# Patient Record
Sex: FEMALE | Race: WHITE | Employment: OTHER | URBAN - METROPOLITAN AREA
[De-identification: names, ages, dates, MRNs, and addresses within clinical notes are randomized per-mention and may not be internally consistent; named-entity substitution may affect disease eponyms.]

---

## 2023-08-03 ENCOUNTER — OFFICE VISIT (OUTPATIENT)
Dept: NEUROSURGERY | Age: 43
End: 2023-08-03
Payer: MEDICARE

## 2023-08-03 VITALS
WEIGHT: 290 LBS | HEIGHT: 67 IN | DIASTOLIC BLOOD PRESSURE: 78 MMHG | TEMPERATURE: 97 F | HEART RATE: 97 BPM | BODY MASS INDEX: 45.52 KG/M2 | SYSTOLIC BLOOD PRESSURE: 148 MMHG | OXYGEN SATURATION: 100 %

## 2023-08-03 DIAGNOSIS — C41.0 MALIGNANT NEOPLASM OF BONES OF SKULL AND FACE (HCC): Primary | ICD-10-CM

## 2023-08-03 PROBLEM — D16.4 BENIGN TUMOR OF BONES OF SKULL AND FACE: Status: ACTIVE | Noted: 2023-08-03

## 2023-08-03 PROCEDURE — 99204 OFFICE O/P NEW MOD 45 MIN: CPT | Performed by: NEUROLOGICAL SURGERY

## 2023-08-03 PROCEDURE — G8417 CALC BMI ABV UP PARAM F/U: HCPCS | Performed by: NEUROLOGICAL SURGERY

## 2023-08-03 PROCEDURE — G8427 DOCREV CUR MEDS BY ELIG CLIN: HCPCS | Performed by: NEUROLOGICAL SURGERY

## 2023-08-03 PROCEDURE — 1036F TOBACCO NON-USER: CPT | Performed by: NEUROLOGICAL SURGERY

## 2023-08-03 RX ORDER — HYDROXYCHLOROQUINE SULFATE 200 MG/1
TABLET, FILM COATED ORAL
COMMUNITY

## 2023-08-03 RX ORDER — NYSTATIN 100000 [USP'U]/G
POWDER TOPICAL
COMMUNITY
Start: 2023-07-26

## 2023-08-03 RX ORDER — PREDNISONE 5 MG/ML
SOLUTION ORAL
COMMUNITY

## 2023-08-03 RX ORDER — OMEPRAZOLE 20 MG/1
CAPSULE, DELAYED RELEASE ORAL
COMMUNITY

## 2023-08-03 RX ORDER — HYDROCODONE BITARTRATE AND ACETAMINOPHEN 10; 325 MG/1; MG/1
1 TABLET ORAL EVERY 6 HOURS PRN
COMMUNITY

## 2023-08-03 RX ORDER — ERGOCALCIFEROL 1.25 MG/1
CAPSULE ORAL
COMMUNITY
Start: 2023-07-17

## 2023-08-03 RX ORDER — FERROUS SULFATE 325(65) MG
TABLET ORAL
COMMUNITY
Start: 2023-06-25

## 2023-08-03 RX ORDER — LATANOPROST 50 UG/ML
SOLUTION/ DROPS OPHTHALMIC
COMMUNITY
Start: 2023-06-25

## 2023-08-03 RX ORDER — ASCORBIC ACID 500 MG
TABLET ORAL
COMMUNITY
Start: 2023-06-25

## 2023-08-03 RX ORDER — ACETAMINOPHEN 325 MG/1
TABLET ORAL
COMMUNITY
Start: 2023-06-24

## 2023-08-03 RX ORDER — GINSENG 100 MG
CAPSULE ORAL
COMMUNITY
Start: 2023-06-25

## 2023-08-03 RX ORDER — POTASSIUM CHLORIDE 20 MEQ/1
TABLET, EXTENDED RELEASE ORAL
COMMUNITY

## 2023-08-03 RX ORDER — GABAPENTIN 300 MG/1
CAPSULE ORAL
COMMUNITY
Start: 2023-07-17

## 2023-08-03 RX ORDER — FOLIC ACID 5 MG/ML
INJECTION, SOLUTION INTRAMUSCULAR; INTRAVENOUS; SUBCUTANEOUS
COMMUNITY

## 2023-08-03 RX ORDER — BUMETANIDE 2 MG/1
2 TABLET ORAL DAILY
COMMUNITY
Start: 2020-08-30

## 2023-08-03 RX ORDER — BRIMONIDINE TARTRATE 2 MG/ML
SOLUTION/ DROPS OPHTHALMIC
COMMUNITY

## 2023-08-03 NOTE — PROGRESS NOTES
History of Present Illness    Patient Words: 39 y.o. This patient is a 39 y.o. female who presents today for evaluation of a left-sided parietal skull tumor diagnosed by CT brain scanning when she presented to the emergency room in outside hospital for head trauma. No intracranial abnormalities were detected however the left parietal destructive lesion was seen. MRI scanning of the brain with and without contrast shows a contrast-enhancing lesion in the left parietal skull with inward extension and possible inner cortex disruption. The patient and family are poor historians but she did state that she was told that she had a spot on her lung. When asked if she has had scans of her chest abdomen pelvis she could not recall. She denies any pain in the left side of her skull. Past Medical History:   Diagnosis Date    Fibromyalgia     Glaucoma     Kidney disease     Lupus (HCC)     Rheumatoid arthritis (HCC)         Allergies   Allergen Reactions    Sulfa Antibiotics Rash    Acetazolamide         No family history on file.      Social History     Socioeconomic History    Marital status:      Spouse name: Not on file    Number of children: Not on file    Years of education: Not on file    Highest education level: Not on file   Occupational History    Not on file   Tobacco Use    Smoking status: Former     Types: Cigarettes     Quit date:      Years since quittin.5    Smokeless tobacco: Never   Substance and Sexual Activity    Alcohol use: Not Currently    Drug use: Not Currently    Sexual activity: Not on file   Other Topics Concern    Not on file   Social History Narrative    Not on file     Social Determinants of Health     Financial Resource Strain: Not on file   Food Insecurity: Not on file   Transportation Needs: Not on file   Physical Activity: Not on file   Stress: Not on file   Social Connections: Not on file   Intimate Partner Violence: Not on file   Housing Stability: Not on file

## 2023-08-08 ENCOUNTER — TELEPHONE (OUTPATIENT)
Dept: NEUROSURGERY | Age: 43
End: 2023-08-08

## 2023-08-08 DIAGNOSIS — Z12.89 ENCOUNTER FOR SCREENING FOR MALIGNANT NEOPLASM OF OTHER SITES: ICD-10-CM

## 2023-08-08 DIAGNOSIS — R93.89 ABNORMAL FINDINGS ON DIAGNOSTIC IMAGING OF OTHER SPECIFIED BODY STRUCTURES: ICD-10-CM

## 2023-08-08 DIAGNOSIS — C41.0 MALIGNANT NEOPLASM OF BONES OF SKULL AND FACE (HCC): Primary | ICD-10-CM

## 2023-08-08 NOTE — TELEPHONE ENCOUNTER
Please check out this CT order, pt needs ct chest abd pelvis to rule out malignancy. Code may be wrong.     Please call pt to give her an update  Thanks

## 2023-08-17 ENCOUNTER — TELEPHONE (OUTPATIENT)
Dept: NEUROSURGERY | Age: 43
End: 2023-08-17

## 2023-08-17 ENCOUNTER — HOSPITAL ENCOUNTER (INPATIENT)
Age: 43
LOS: 4 days | Discharge: HOME | DRG: 176 | End: 2023-08-21
Attending: EMERGENCY MEDICINE | Admitting: FAMILY MEDICINE
Payer: MEDICARE

## 2023-08-17 ENCOUNTER — HOSPITAL ENCOUNTER (OUTPATIENT)
Dept: CT IMAGING | Age: 43
Discharge: HOME | DRG: 176 | End: 2023-08-17
Attending: NEUROLOGICAL SURGERY
Payer: MEDICARE

## 2023-08-17 DIAGNOSIS — I26.99 PULMONARY EMBOLISM ON RIGHT (HCC): Primary | ICD-10-CM

## 2023-08-17 DIAGNOSIS — R93.89 ABNORMAL FINDINGS ON DIAGNOSTIC IMAGING OF OTHER SPECIFIED BODY STRUCTURES: ICD-10-CM

## 2023-08-17 DIAGNOSIS — C41.0 MALIGNANT NEOPLASM OF BONES OF SKULL AND FACE (HCC): ICD-10-CM

## 2023-08-17 DIAGNOSIS — N17.9 ACUTE KIDNEY INJURY (HCC): ICD-10-CM

## 2023-08-17 DIAGNOSIS — Z12.89 ENCOUNTER FOR SCREENING FOR MALIGNANT NEOPLASM OF OTHER SITES: ICD-10-CM

## 2023-08-17 PROBLEM — R65.21 SEPTIC SHOCK (HCC): Status: RESOLVED | Noted: 2020-08-26 | Resolved: 2023-08-17

## 2023-08-17 PROBLEM — I10 ESSENTIAL (PRIMARY) HYPERTENSION: Status: ACTIVE | Noted: 2020-08-28

## 2023-08-17 PROBLEM — G62.9 POLYNEUROPATHY, UNSPECIFIED: Status: ACTIVE | Noted: 2019-09-27

## 2023-08-17 PROBLEM — I82.509 CHRONIC DEEP VEIN THROMBOSIS (DVT) OF LOWER EXTREMITY (HCC): Status: RESOLVED | Noted: 2020-08-29 | Resolved: 2023-08-17

## 2023-08-17 PROBLEM — S92.351G CLOSED DISPLACED FRACTURE OF FIFTH METATARSAL BONE OF RIGHT FOOT WITH DELAYED HEALING: Status: RESOLVED | Noted: 2023-05-27 | Resolved: 2023-08-17

## 2023-08-17 PROBLEM — S81.812A LACERATION OF LEFT LOWER EXTREMITY: Status: ACTIVE | Noted: 2023-05-14

## 2023-08-17 PROBLEM — D50.0 IRON DEFICIENCY ANEMIA SECONDARY TO BLOOD LOSS (CHRONIC): Status: ACTIVE | Noted: 2019-09-27

## 2023-08-17 PROBLEM — N18.32 STAGE 3B CHRONIC KIDNEY DISEASE (HCC): Status: ACTIVE | Noted: 2021-04-09

## 2023-08-17 PROBLEM — R65.21 SEPTIC SHOCK (HCC): Status: ACTIVE | Noted: 2020-08-26

## 2023-08-17 PROBLEM — M32.14: Status: ACTIVE | Noted: 2019-09-27

## 2023-08-17 PROBLEM — R41.841 COGNITIVE COMMUNICATION DEFICIT: Status: ACTIVE | Noted: 2023-06-24

## 2023-08-17 PROBLEM — S92.351G CLOSED DISPLACED FRACTURE OF FIFTH METATARSAL BONE OF RIGHT FOOT WITH DELAYED HEALING: Status: ACTIVE | Noted: 2023-05-27

## 2023-08-17 PROBLEM — A41.9 SEPTIC SHOCK (HCC): Status: RESOLVED | Noted: 2020-08-26 | Resolved: 2023-08-17

## 2023-08-17 PROBLEM — K55.20 ANGIODYSPLASIA OF GASTROINTESTINAL TRACT: Status: RESOLVED | Noted: 2019-10-25 | Resolved: 2023-08-17

## 2023-08-17 PROBLEM — M32.14 SLE GLOMERULONEPHRITIS SYNDROME (HCC): Status: ACTIVE | Noted: 2017-04-04

## 2023-08-17 PROBLEM — M79.7 FIBROMYALGIA: Status: ACTIVE | Noted: 2017-07-25

## 2023-08-17 PROBLEM — L03.119 CELLULITIS OF EXTREMITY: Status: ACTIVE | Noted: 2020-08-26

## 2023-08-17 PROBLEM — L71.9 ROSACEA: Status: ACTIVE | Noted: 2017-07-20

## 2023-08-17 PROBLEM — A41.9 SEPTIC SHOCK (HCC): Status: ACTIVE | Noted: 2020-08-26

## 2023-08-17 PROBLEM — R91.1 SOLITARY PULMONARY NODULE: Status: ACTIVE | Noted: 2023-06-24

## 2023-08-17 PROBLEM — I82.509 CHRONIC DEEP VEIN THROMBOSIS (DVT) OF LOWER EXTREMITY (HCC): Status: ACTIVE | Noted: 2020-08-29

## 2023-08-17 PROBLEM — K21.9 GASTROESOPHAGEAL REFLUX DISEASE WITHOUT ESOPHAGITIS: Status: ACTIVE | Noted: 2023-06-24

## 2023-08-17 PROBLEM — B37.2 CANDIDIASIS OF SKIN: Status: ACTIVE | Noted: 2023-07-17

## 2023-08-17 PROBLEM — E66.01 MORBID OBESITY (HCC): Status: ACTIVE | Noted: 2023-06-24

## 2023-08-17 PROBLEM — K55.20 ANGIODYSPLASIA OF GASTROINTESTINAL TRACT: Status: ACTIVE | Noted: 2019-10-25

## 2023-08-17 PROBLEM — D84.9 IMMUNOSUPPRESSED STATUS (HCC): Chronic | Status: ACTIVE | Noted: 2023-08-17

## 2023-08-17 PROBLEM — E87.6 HYPOKALEMIA: Status: ACTIVE | Noted: 2023-08-17

## 2023-08-17 LAB
ALBUMIN SERPL-MCNC: 2.9 G/DL (ref 3.5–5)
ALBUMIN/GLOB SERPL: 0.8 (ref 0.4–1.6)
ALP SERPL-CCNC: 95 U/L (ref 50–136)
ALT SERPL-CCNC: 19 U/L (ref 12–65)
ANION GAP SERPL CALC-SCNC: 10 MMOL/L (ref 2–11)
APPEARANCE UR: CLEAR
APTT PPP: 21.6 SEC (ref 24.5–34.2)
AST SERPL-CCNC: 23 U/L (ref 15–37)
BACTERIA URNS QL MICRO: NEGATIVE /HPF
BASOPHILS # BLD: 0.1 K/UL (ref 0–0.2)
BASOPHILS NFR BLD: 0 % (ref 0–2)
BILIRUB SERPL-MCNC: 0.4 MG/DL (ref 0.2–1.1)
BILIRUB UR QL: NEGATIVE
BUN SERPL-MCNC: 41 MG/DL (ref 6–23)
CALCIUM SERPL-MCNC: 8.5 MG/DL (ref 8.3–10.4)
CASTS URNS QL MICRO: ABNORMAL /LPF
CHLORIDE SERPL-SCNC: 102 MMOL/L (ref 101–110)
CO2 SERPL-SCNC: 29 MMOL/L (ref 21–32)
COLOR UR: ABNORMAL
CREAT SERPL-MCNC: 1.9 MG/DL (ref 0.6–1)
DIFFERENTIAL METHOD BLD: ABNORMAL
EKG ATRIAL RATE: 116 BPM
EKG DIAGNOSIS: NORMAL
EKG P AXIS: 49 DEGREES
EKG P-R INTERVAL: 148 MS
EKG Q-T INTERVAL: 308 MS
EKG QRS DURATION: 66 MS
EKG QTC CALCULATION (BAZETT): 428 MS
EKG R AXIS: 35 DEGREES
EKG T AXIS: 33 DEGREES
EKG VENTRICULAR RATE: 116 BPM
EOSINOPHIL # BLD: 0 K/UL (ref 0–0.8)
EOSINOPHIL NFR BLD: 0 % (ref 0.5–7.8)
EPI CELLS #/AREA URNS HPF: ABNORMAL /HPF
ERYTHROCYTE [DISTWIDTH] IN BLOOD BY AUTOMATED COUNT: 16.8 % (ref 11.9–14.6)
ERYTHROCYTE [DISTWIDTH] IN BLOOD BY AUTOMATED COUNT: 16.8 % (ref 11.9–14.6)
GLOBULIN SER CALC-MCNC: 3.6 G/DL (ref 2.8–4.5)
GLUCOSE SERPL-MCNC: 124 MG/DL (ref 65–100)
GLUCOSE UR STRIP.AUTO-MCNC: NEGATIVE MG/DL
HCT VFR BLD AUTO: 33.6 % (ref 35.8–46.3)
HCT VFR BLD AUTO: 35.9 % (ref 35.8–46.3)
HGB BLD-MCNC: 10.3 G/DL (ref 11.7–15.4)
HGB BLD-MCNC: 11 G/DL (ref 11.7–15.4)
HGB UR QL STRIP: NEGATIVE
IMM GRANULOCYTES # BLD AUTO: 0.3 K/UL (ref 0–0.5)
IMM GRANULOCYTES NFR BLD AUTO: 2 % (ref 0–5)
INR PPP: 0.9
INR PPP: 0.9
KETONES UR QL STRIP.AUTO: NEGATIVE MG/DL
LEUKOCYTE ESTERASE UR QL STRIP.AUTO: ABNORMAL
LYMPHOCYTES # BLD: 2 K/UL (ref 0.5–4.6)
LYMPHOCYTES NFR BLD: 15 % (ref 13–44)
MAGNESIUM SERPL-MCNC: 2.4 MG/DL (ref 1.8–2.4)
MCH RBC QN AUTO: 30.4 PG (ref 26.1–32.9)
MCH RBC QN AUTO: 30.5 PG (ref 26.1–32.9)
MCHC RBC AUTO-ENTMCNC: 30.6 G/DL (ref 31.4–35)
MCHC RBC AUTO-ENTMCNC: 30.7 G/DL (ref 31.4–35)
MCV RBC AUTO: 99.1 FL (ref 82–102)
MCV RBC AUTO: 99.4 FL (ref 82–102)
MONOCYTES # BLD: 1 K/UL (ref 0.1–1.3)
MONOCYTES NFR BLD: 7 % (ref 4–12)
NEUTS SEG # BLD: 10.6 K/UL (ref 1.7–8.2)
NEUTS SEG NFR BLD: 76 % (ref 43–78)
NITRITE UR QL STRIP.AUTO: NEGATIVE
NRBC # BLD: 0 K/UL (ref 0–0.2)
NRBC # BLD: 0 K/UL (ref 0–0.2)
PH UR STRIP: 5.5 (ref 5–9)
PLATELET # BLD AUTO: 239 K/UL (ref 150–450)
PLATELET # BLD AUTO: 255 K/UL (ref 150–450)
PMV BLD AUTO: 9 FL (ref 9.4–12.3)
PMV BLD AUTO: 9.1 FL (ref 9.4–12.3)
POTASSIUM SERPL-SCNC: 3.3 MMOL/L (ref 3.5–5.1)
PROT SERPL-MCNC: 6.5 G/DL (ref 6.3–8.2)
PROT UR STRIP-MCNC: ABNORMAL MG/DL
PROTHROMBIN TIME: 12.8 SEC (ref 12.6–14.3)
PROTHROMBIN TIME: 13 SEC (ref 12.6–14.3)
RBC # BLD AUTO: 3.39 M/UL (ref 4.05–5.2)
RBC # BLD AUTO: 3.61 M/UL (ref 4.05–5.2)
RBC #/AREA URNS HPF: ABNORMAL /HPF
SODIUM SERPL-SCNC: 141 MMOL/L (ref 133–143)
SP GR UR REFRACTOMETRY: 1.03 (ref 1–1.02)
TROPONIN I SERPL HS-MCNC: 14.4 PG/ML (ref 0–14)
TROPONIN I SERPL HS-MCNC: 15.1 PG/ML (ref 0–14)
UFH PPP CHRO-ACNC: <0.09 IU/ML (ref 0.3–0.7)
UROBILINOGEN UR QL STRIP.AUTO: 0.2 EU/DL (ref 0.2–1)
WBC # BLD AUTO: 13.9 K/UL (ref 4.3–11.1)
WBC # BLD AUTO: 14.2 K/UL (ref 4.3–11.1)
WBC URNS QL MICRO: ABNORMAL /HPF

## 2023-08-17 PROCEDURE — 2500000003 HC RX 250 WO HCPCS: Performed by: NURSE PRACTITIONER

## 2023-08-17 PROCEDURE — 6360000004 HC RX CONTRAST MEDICATION: Performed by: NEUROLOGICAL SURGERY

## 2023-08-17 PROCEDURE — 83735 ASSAY OF MAGNESIUM: CPT

## 2023-08-17 PROCEDURE — 81001 URINALYSIS AUTO W/SCOPE: CPT

## 2023-08-17 PROCEDURE — 6360000002 HC RX W HCPCS: Performed by: NURSE PRACTITIONER

## 2023-08-17 PROCEDURE — 80053 COMPREHEN METABOLIC PANEL: CPT

## 2023-08-17 PROCEDURE — 85025 COMPLETE CBC W/AUTO DIFF WBC: CPT

## 2023-08-17 PROCEDURE — 71260 CT THORAX DX C+: CPT

## 2023-08-17 PROCEDURE — 93010 ELECTROCARDIOGRAM REPORT: CPT | Performed by: INTERNAL MEDICINE

## 2023-08-17 PROCEDURE — 2580000003 HC RX 258: Performed by: FAMILY MEDICINE

## 2023-08-17 PROCEDURE — 93005 ELECTROCARDIOGRAM TRACING: CPT | Performed by: NURSE PRACTITIONER

## 2023-08-17 PROCEDURE — 85730 THROMBOPLASTIN TIME PARTIAL: CPT

## 2023-08-17 PROCEDURE — 36415 COLL VENOUS BLD VENIPUNCTURE: CPT

## 2023-08-17 PROCEDURE — 96374 THER/PROPH/DIAG INJ IV PUSH: CPT

## 2023-08-17 PROCEDURE — 1100000003 HC PRIVATE W/ TELEMETRY

## 2023-08-17 PROCEDURE — 85610 PROTHROMBIN TIME: CPT

## 2023-08-17 PROCEDURE — 85520 HEPARIN ASSAY: CPT

## 2023-08-17 PROCEDURE — 99285 EMERGENCY DEPT VISIT HI MDM: CPT

## 2023-08-17 PROCEDURE — 84484 ASSAY OF TROPONIN QUANT: CPT

## 2023-08-17 PROCEDURE — 2500000003 HC RX 250 WO HCPCS: Performed by: FAMILY MEDICINE

## 2023-08-17 PROCEDURE — 6370000000 HC RX 637 (ALT 250 FOR IP): Performed by: FAMILY MEDICINE

## 2023-08-17 PROCEDURE — 85027 COMPLETE CBC AUTOMATED: CPT

## 2023-08-17 RX ORDER — HEPARIN SODIUM 1000 [USP'U]/ML
10000 INJECTION, SOLUTION INTRAVENOUS; SUBCUTANEOUS ONCE
Status: COMPLETED | OUTPATIENT
Start: 2023-08-17 | End: 2023-08-17

## 2023-08-17 RX ORDER — ONDANSETRON 2 MG/ML
4 INJECTION INTRAMUSCULAR; INTRAVENOUS EVERY 6 HOURS PRN
Status: DISCONTINUED | OUTPATIENT
Start: 2023-08-17 | End: 2023-08-21 | Stop reason: HOSPADM

## 2023-08-17 RX ORDER — HEPARIN SODIUM 10000 [USP'U]/100ML
5-30 INJECTION, SOLUTION INTRAVENOUS CONTINUOUS
Status: DISCONTINUED | OUTPATIENT
Start: 2023-08-17 | End: 2023-08-21

## 2023-08-17 RX ORDER — SODIUM CHLORIDE 0.9 % (FLUSH) 0.9 %
5-40 SYRINGE (ML) INJECTION EVERY 12 HOURS SCHEDULED
Status: DISCONTINUED | OUTPATIENT
Start: 2023-08-17 | End: 2023-08-21 | Stop reason: HOSPADM

## 2023-08-17 RX ORDER — HEPARIN SODIUM 1000 [USP'U]/ML
5000 INJECTION, SOLUTION INTRAVENOUS; SUBCUTANEOUS PRN
Status: DISCONTINUED | OUTPATIENT
Start: 2023-08-17 | End: 2023-08-21

## 2023-08-17 RX ORDER — SODIUM CHLORIDE 0.9 % (FLUSH) 0.9 %
5-40 SYRINGE (ML) INJECTION PRN
Status: DISCONTINUED | OUTPATIENT
Start: 2023-08-17 | End: 2023-08-21 | Stop reason: HOSPADM

## 2023-08-17 RX ORDER — HEPARIN SODIUM 1000 [USP'U]/ML
10000 INJECTION, SOLUTION INTRAVENOUS; SUBCUTANEOUS PRN
Status: DISCONTINUED | OUTPATIENT
Start: 2023-08-17 | End: 2023-08-21

## 2023-08-17 RX ORDER — ONDANSETRON 4 MG/1
4 TABLET, ORALLY DISINTEGRATING ORAL EVERY 8 HOURS PRN
Status: DISCONTINUED | OUTPATIENT
Start: 2023-08-17 | End: 2023-08-21 | Stop reason: HOSPADM

## 2023-08-17 RX ORDER — ACETAMINOPHEN 650 MG/1
650 SUPPOSITORY RECTAL EVERY 6 HOURS PRN
Status: DISCONTINUED | OUTPATIENT
Start: 2023-08-17 | End: 2023-08-21 | Stop reason: HOSPADM

## 2023-08-17 RX ORDER — HYDROXYCHLOROQUINE SULFATE 200 MG/1
200 TABLET, FILM COATED ORAL
Status: DISCONTINUED | OUTPATIENT
Start: 2023-08-21 | End: 2023-08-21 | Stop reason: HOSPADM

## 2023-08-17 RX ORDER — PREDNISONE 5 MG/1
5 TABLET ORAL 2 TIMES DAILY
Status: DISCONTINUED | OUTPATIENT
Start: 2023-08-17 | End: 2023-08-21 | Stop reason: HOSPADM

## 2023-08-17 RX ORDER — GABAPENTIN 300 MG/1
300 CAPSULE ORAL DAILY
Status: DISCONTINUED | OUTPATIENT
Start: 2023-08-18 | End: 2023-08-21 | Stop reason: HOSPADM

## 2023-08-17 RX ORDER — PANTOPRAZOLE SODIUM 40 MG/1
40 TABLET, DELAYED RELEASE ORAL
Status: DISCONTINUED | OUTPATIENT
Start: 2023-08-18 | End: 2023-08-21 | Stop reason: HOSPADM

## 2023-08-17 RX ORDER — GABAPENTIN 300 MG/1
300 CAPSULE ORAL DAILY
Status: DISCONTINUED | OUTPATIENT
Start: 2023-08-17 | End: 2023-08-17 | Stop reason: SDUPTHER

## 2023-08-17 RX ORDER — ACETAMINOPHEN 325 MG/1
650 TABLET ORAL EVERY 6 HOURS PRN
Status: DISCONTINUED | OUTPATIENT
Start: 2023-08-17 | End: 2023-08-21 | Stop reason: HOSPADM

## 2023-08-17 RX ORDER — POLYETHYLENE GLYCOL 3350 17 G/17G
17 POWDER, FOR SOLUTION ORAL DAILY PRN
Status: DISCONTINUED | OUTPATIENT
Start: 2023-08-17 | End: 2023-08-21 | Stop reason: HOSPADM

## 2023-08-17 RX ORDER — LATANOPROST 50 UG/ML
1 SOLUTION/ DROPS OPHTHALMIC NIGHTLY
Status: DISCONTINUED | OUTPATIENT
Start: 2023-08-17 | End: 2023-08-21 | Stop reason: HOSPADM

## 2023-08-17 RX ORDER — BRIMONIDINE TARTRATE 2 MG/ML
1 SOLUTION/ DROPS OPHTHALMIC 2 TIMES DAILY
Status: DISCONTINUED | OUTPATIENT
Start: 2023-08-17 | End: 2023-08-21 | Stop reason: HOSPADM

## 2023-08-17 RX ORDER — SODIUM CHLORIDE 9 MG/ML
INJECTION, SOLUTION INTRAVENOUS PRN
Status: DISCONTINUED | OUTPATIENT
Start: 2023-08-17 | End: 2023-08-21 | Stop reason: HOSPADM

## 2023-08-17 RX ORDER — GABAPENTIN 300 MG/1
600 CAPSULE ORAL NIGHTLY
Status: DISCONTINUED | OUTPATIENT
Start: 2023-08-17 | End: 2023-08-21 | Stop reason: HOSPADM

## 2023-08-17 RX ORDER — WARFARIN SODIUM 5 MG/1
5 TABLET ORAL DAILY
Status: DISCONTINUED | OUTPATIENT
Start: 2023-08-17 | End: 2023-08-18

## 2023-08-17 RX ADMIN — BRIMONIDINE TARTRATE 1 DROP: 2 SOLUTION/ DROPS OPHTHALMIC at 22:09

## 2023-08-17 RX ADMIN — DIATRIZOATE MEGLUMINE AND DIATRIZOATE SODIUM 15 ML: 660; 100 LIQUID ORAL; RECTAL at 10:19

## 2023-08-17 RX ADMIN — WARFARIN SODIUM 5 MG: 5 TABLET ORAL at 22:03

## 2023-08-17 RX ADMIN — HEPARIN SODIUM 15 UNITS/KG/HR: 10000 INJECTION, SOLUTION INTRAVENOUS at 17:44

## 2023-08-17 RX ADMIN — TUBERCULIN PURIFIED PROTEIN DERIVATIVE 5 UNITS: 5 INJECTION, SOLUTION INTRADERMAL at 22:03

## 2023-08-17 RX ADMIN — GABAPENTIN 600 MG: 300 CAPSULE ORAL at 22:03

## 2023-08-17 RX ADMIN — HEPARIN SODIUM 10000 UNITS: 1000 INJECTION INTRAVENOUS; SUBCUTANEOUS at 17:40

## 2023-08-17 RX ADMIN — IOPAMIDOL 100 ML: 755 INJECTION, SOLUTION INTRAVENOUS at 10:18

## 2023-08-17 RX ADMIN — LATANOPROST 1 DROP: 50 SOLUTION OPHTHALMIC at 22:05

## 2023-08-17 RX ADMIN — SODIUM CHLORIDE, PRESERVATIVE FREE 10 ML: 5 INJECTION INTRAVENOUS at 22:06

## 2023-08-17 ASSESSMENT — LIFESTYLE VARIABLES
HOW MANY STANDARD DRINKS CONTAINING ALCOHOL DO YOU HAVE ON A TYPICAL DAY: PATIENT DOES NOT DRINK
HOW OFTEN DO YOU HAVE A DRINK CONTAINING ALCOHOL: NEVER

## 2023-08-17 NOTE — ED NOTES
TRANSFER - OUT REPORT:    Verbal report given to ALEJA Castillo on Adalberto June  being transferred to 5 for routine progression of patient care       Report consisted of patient's Situation, Background, Assessment and   Recommendations(SBAR). Information from the following report(s) ED SBAR was reviewed with the receiving nurse. Lines:   Peripheral IV 08/17/23 Left Antecubital (Active)        Opportunity for questions and clarification was provided.       Patient transported with:  Registered Nurse      Irven Oppenheim, RN  08/17/23 8194

## 2023-08-17 NOTE — ED PROVIDER NOTES
windows demonstrates inflammatory appearing changes in the  right lower lobe on image 36. Given the suggested PE at this level, this is  concerning for pulmonary infarction. Additional more defined lesions are seen  most evident in the superior segment of the right lower lobe where 1.5 cm nodule  is seen although there is an additional 3 mm nodule in the left lung apex on  image 12. No pleural effusion is seen. Lungs are expanded without evidence for  pneumothorax. Multiple sclerotic osseous lesions are seen most evident in the  sternum and right T3 pedicle. Additional expansile rib lesions are seen with  expansile nature this most evident in the left ninth rib which demonstrates a  central lytic focus. Osseous metastases are not excluded. Regular attenuation is  seen in the upper right breast appearing to extend preferentially in the 1 and  2:00 soft tissues which is incompletely characterized. CT ABDOMEN:    The Liver small low attenuation lesions with a 0.9 cm left lobe lesion seen on  image 52, and a subtle 0.8 cm superior right lobe lesion seen on image 43. The  spleen demonstrates multiple cystic lesions without suspicious masslike  enhancement favored to represent benign congenital or acquired cyst..  No  contour deforming or enhancing mass lesions are seen of the pancreas or adrenal  glands. The gallbladder has been removed. The kidneys enhance symmetrically  and no evidence of hydronephrosis is seen. A lateral renal cortical scarring is  seen, right greater than left. There is a 0.8 cm low-attenuation exophytic  lesion extending off the anterior mid to lower pole cortex of the right kidney. Although too small to characterize, this does not demonstrate enhancement  favored to represent a tiny cortical cyst.    The visualized loops of small bowel and colon are normal in caliber. The  appendix shows no acute changes. No free fluid and no free air is seen in the  abdomen.   No adenopathy is seen of

## 2023-08-17 NOTE — CONSULTS
Concerning the recent CT showing primarily right sided PE--I would recommend only anticoagulation. The volume and location suggest that catheter directed therapy would not provide any other benefit.

## 2023-08-17 NOTE — TELEPHONE ENCOUNTER
Per Dr. Cait Marie as communicated in [facility] by radiology- CT results reveal a convincing RLL PE and a likely infarct of the right lower lobe- per Dr. Cait Marie, notify patient to proceed to [facility] ER for an evaluation. Other results per CT scan- patient has a follow up appointment with Dr. Cait Marie on 8-23 for a skull lesion evaluation per Dr. Brendan Arceo. Patient notified at 1:54 and will head to [facility] ER.   Patient at this time denies any chest pain or shortness of breathe

## 2023-08-17 NOTE — ED TRIAGE NOTES
Pt presents to triage via wheelchair. Pt reports she was diagnosed w/ a pulmonary embolism and R lung infarction today and referred to come here. Pt reports hx of lung cancer. Pt denies SOB, chest pain, N/V/D.

## 2023-08-18 ENCOUNTER — APPOINTMENT (OUTPATIENT)
Dept: NON INVASIVE DIAGNOSTICS | Age: 43
DRG: 176 | End: 2023-08-18
Attending: FAMILY MEDICINE
Payer: MEDICARE

## 2023-08-18 ENCOUNTER — APPOINTMENT (OUTPATIENT)
Dept: ULTRASOUND IMAGING | Age: 43
DRG: 176 | End: 2023-08-18
Payer: MEDICARE

## 2023-08-18 DIAGNOSIS — N83.8 OVARIAN ENLARGEMENT, RIGHT: ICD-10-CM

## 2023-08-18 DIAGNOSIS — N63.12 MASS OF UPPER INNER QUADRANT OF RIGHT BREAST: Primary | ICD-10-CM

## 2023-08-18 LAB
25(OH)D3 SERPL-MCNC: 34.9 NG/ML (ref 30–100)
ALBUMIN SERPL-MCNC: 2.3 G/DL (ref 3.5–5)
ALBUMIN/GLOB SERPL: 0.8 (ref 0.4–1.6)
ALP SERPL-CCNC: 78 U/L (ref 50–136)
ALT SERPL-CCNC: 20 U/L (ref 12–65)
ANION GAP SERPL CALC-SCNC: 11 MMOL/L (ref 2–11)
AST SERPL-CCNC: 24 U/L (ref 15–37)
BASOPHILS # BLD: 0 K/UL (ref 0–0.2)
BASOPHILS NFR BLD: 0 % (ref 0–2)
BILIRUB SERPL-MCNC: 0.4 MG/DL (ref 0.2–1.1)
BUN SERPL-MCNC: 40 MG/DL (ref 6–23)
CALCIUM SERPL-MCNC: 8.1 MG/DL (ref 8.3–10.4)
CEA SERPL-MCNC: 3.1 NG/ML (ref 0–3)
CHLORIDE SERPL-SCNC: 104 MMOL/L (ref 101–110)
CO2 SERPL-SCNC: 27 MMOL/L (ref 21–32)
CREAT SERPL-MCNC: 1.6 MG/DL (ref 0.6–1)
DIFFERENTIAL METHOD BLD: ABNORMAL
ECHO AO ASC DIAM: 3.4 CM
ECHO AO ASCENDING AORTA INDEX: 1.43 CM/M2
ECHO AO ROOT DIAM: 3.2 CM
ECHO AO ROOT INDEX: 1.35 CM/M2
ECHO AV ACCELERATION TIME: 75 MS
ECHO AV AREA PEAK VELOCITY: 2.5 CM2
ECHO AV AREA VTI: 2.5 CM2
ECHO AV AREA/BSA PEAK VELOCITY: 1.1 CM2/M2
ECHO AV AREA/BSA VTI: 1.1 CM2/M2
ECHO AV MEAN GRADIENT: 12 MMHG
ECHO AV MEAN VELOCITY: 1.6 M/S
ECHO AV PEAK GRADIENT: 22 MMHG
ECHO AV PEAK VELOCITY: 2.4 M/S
ECHO AV VELOCITY RATIO: 0.71
ECHO AV VTI: 38.6 CM
ECHO BSA: 2.49 M2
ECHO EST RA PRESSURE: 3 MMHG
ECHO IVC PROX: 1.8 CM
ECHO LA AREA 2C: 22.4 CM2
ECHO LA AREA 4C: 23.7 CM2
ECHO LA DIAMETER INDEX: 1.56 CM/M2
ECHO LA DIAMETER: 3.7 CM
ECHO LA MAJOR AXIS: 6 CM
ECHO LA MINOR AXIS: 6.1 CM
ECHO LA TO AORTIC ROOT RATIO: 1.16
ECHO LA VOL 2C: 66 ML (ref 22–52)
ECHO LA VOL 4C: 71 ML (ref 22–52)
ECHO LA VOL BP: 69 ML (ref 22–52)
ECHO LA VOL/BSA BIPLANE: 29 ML/M2 (ref 16–34)
ECHO LA VOLUME INDEX A2C: 28 ML/M2 (ref 16–34)
ECHO LA VOLUME INDEX A4C: 30 ML/M2 (ref 16–34)
ECHO LV E' LATERAL VELOCITY: 14 CM/S
ECHO LV E' SEPTAL VELOCITY: 12 CM/S
ECHO LV EDV A2C: 130 ML
ECHO LV EDV A4C: 152 ML
ECHO LV EDV INDEX A4C: 64 ML/M2
ECHO LV EDV NDEX A2C: 55 ML/M2
ECHO LV EJECTION FRACTION A2C: 59 %
ECHO LV EJECTION FRACTION A4C: 55 %
ECHO LV EJECTION FRACTION BIPLANE: 55 % (ref 55–100)
ECHO LV ESV A2C: 53 ML
ECHO LV ESV A4C: 69 ML
ECHO LV ESV INDEX A2C: 22 ML/M2
ECHO LV ESV INDEX A4C: 29 ML/M2
ECHO LV FRACTIONAL SHORTENING: 22 % (ref 28–44)
ECHO LV INTERNAL DIMENSION DIASTOLE INDEX: 2.11 CM/M2
ECHO LV INTERNAL DIMENSION DIASTOLIC: 5 CM (ref 3.9–5.3)
ECHO LV INTERNAL DIMENSION SYSTOLIC INDEX: 1.65 CM/M2
ECHO LV INTERNAL DIMENSION SYSTOLIC: 3.9 CM
ECHO LV IVSD: 0.9 CM (ref 0.6–0.9)
ECHO LV MASS 2D: 158.2 G (ref 67–162)
ECHO LV MASS INDEX 2D: 66.8 G/M2 (ref 43–95)
ECHO LV POSTERIOR WALL DIASTOLIC: 0.9 CM (ref 0.6–0.9)
ECHO LV RELATIVE WALL THICKNESS RATIO: 0.36
ECHO LVOT AREA: 3.5 CM2
ECHO LVOT AV VTI INDEX: 0.72
ECHO LVOT DIAM: 2.1 CM
ECHO LVOT MEAN GRADIENT: 6 MMHG
ECHO LVOT PEAK GRADIENT: 11 MMHG
ECHO LVOT PEAK VELOCITY: 1.7 M/S
ECHO LVOT STROKE VOLUME INDEX: 40.3 ML/M2
ECHO LVOT SV: 95.5 ML
ECHO LVOT VTI: 27.6 CM
ECHO MV A VELOCITY: 1.24 M/S
ECHO MV AREA VTI: 4.2 CM2
ECHO MV E DECELERATION TIME (DT): 145 MS
ECHO MV E VELOCITY: 0.87 M/S
ECHO MV E/A RATIO: 0.7
ECHO MV E/E' LATERAL: 6.21
ECHO MV E/E' RATIO (AVERAGED): 6.73
ECHO MV E/E' SEPTAL: 7.25
ECHO MV LVOT VTI INDEX: 0.83
ECHO MV MAX VELOCITY: 1.3 M/S
ECHO MV MEAN GRADIENT: 3 MMHG
ECHO MV MEAN VELOCITY: 0.9 M/S
ECHO MV PEAK GRADIENT: 6 MMHG
ECHO MV VTI: 23 CM
ECHO PV ACCELERATION TIME (AT): 85 MS
ECHO PV MAX VELOCITY: 1.7 M/S
ECHO PV PEAK GRADIENT: 11 MMHG
ECHO RIGHT VENTRICULAR SYSTOLIC PRESSURE (RVSP): 35 MMHG
ECHO RV BASAL DIMENSION: 3.9 CM
ECHO RV FREE WALL PEAK S': 19 CM/S
ECHO RV INTERNAL DIMENSION: 3 CM
ECHO RV TAPSE: 2.4 CM (ref 1.7–?)
ECHO TV REGURGITANT MAX VELOCITY: 2.82 M/S
ECHO TV REGURGITANT PEAK GRADIENT: 32 MMHG
EOSINOPHIL # BLD: 0.1 K/UL (ref 0–0.8)
EOSINOPHIL NFR BLD: 1 % (ref 0.5–7.8)
ERYTHROCYTE [DISTWIDTH] IN BLOOD BY AUTOMATED COUNT: 16.8 % (ref 11.9–14.6)
FERRITIN SERPL-MCNC: 135 NG/ML (ref 8–388)
FOLATE SERPL-MCNC: 39.4 NG/ML (ref 3.1–17.5)
GLOBULIN SER CALC-MCNC: 2.9 G/DL (ref 2.8–4.5)
GLUCOSE SERPL-MCNC: 93 MG/DL (ref 65–100)
HCT VFR BLD AUTO: 29.3 % (ref 35.8–46.3)
HGB BLD-MCNC: 8.9 G/DL (ref 11.7–15.4)
IMM GRANULOCYTES # BLD AUTO: 0.3 K/UL (ref 0–0.5)
IMM GRANULOCYTES NFR BLD AUTO: 2 % (ref 0–5)
INR PPP: 1.1
IRON SATN MFR SERPL: 42 %
IRON SERPL-MCNC: 93 UG/DL (ref 35–150)
LYMPHOCYTES # BLD: 1.5 K/UL (ref 0.5–4.6)
LYMPHOCYTES NFR BLD: 13 % (ref 13–44)
MAGNESIUM SERPL-MCNC: 2.3 MG/DL (ref 1.8–2.4)
MCH RBC QN AUTO: 30.3 PG (ref 26.1–32.9)
MCHC RBC AUTO-ENTMCNC: 30.4 G/DL (ref 31.4–35)
MCV RBC AUTO: 99.7 FL (ref 82–102)
MM INDURATION, POC: 0 MM (ref 0–5)
MONOCYTES # BLD: 0.7 K/UL (ref 0.1–1.3)
MONOCYTES NFR BLD: 6 % (ref 4–12)
NEUTS SEG # BLD: 8.6 K/UL (ref 1.7–8.2)
NEUTS SEG NFR BLD: 78 % (ref 43–78)
NRBC # BLD: 0.02 K/UL (ref 0–0.2)
PLATELET # BLD AUTO: 216 K/UL (ref 150–450)
PMV BLD AUTO: 9.1 FL (ref 9.4–12.3)
POTASSIUM SERPL-SCNC: 2.8 MMOL/L (ref 3.5–5.1)
PPD, POC: NEGATIVE
PROT SERPL-MCNC: 5.2 G/DL (ref 6.3–8.2)
PROTHROMBIN TIME: 15.1 SEC (ref 12.6–14.3)
RBC # BLD AUTO: 2.94 M/UL (ref 4.05–5.2)
SODIUM SERPL-SCNC: 142 MMOL/L (ref 133–143)
TIBC SERPL-MCNC: 222 UG/DL (ref 250–450)
UFH PPP CHRO-ACNC: 0.65 IU/ML (ref 0.3–0.7)
UFH PPP CHRO-ACNC: 0.86 IU/ML (ref 0.3–0.7)
UFH PPP CHRO-ACNC: 1.02 IU/ML (ref 0.3–0.7)
UFH PPP CHRO-ACNC: >1.1 IU/ML (ref 0.3–0.7)
VIT B12 SERPL-MCNC: 580 PG/ML (ref 193–986)
WBC # BLD AUTO: 11 K/UL (ref 4.3–11.1)

## 2023-08-18 PROCEDURE — 6370000000 HC RX 637 (ALT 250 FOR IP): Performed by: FAMILY MEDICINE

## 2023-08-18 PROCEDURE — 82746 ASSAY OF FOLIC ACID SERUM: CPT

## 2023-08-18 PROCEDURE — 85610 PROTHROMBIN TIME: CPT

## 2023-08-18 PROCEDURE — 85613 RUSSELL VIPER VENOM DILUTED: CPT

## 2023-08-18 PROCEDURE — 82607 VITAMIN B-12: CPT

## 2023-08-18 PROCEDURE — 83735 ASSAY OF MAGNESIUM: CPT

## 2023-08-18 PROCEDURE — 93306 TTE W/DOPPLER COMPLETE: CPT

## 2023-08-18 PROCEDURE — 83550 IRON BINDING TEST: CPT

## 2023-08-18 PROCEDURE — 80053 COMPREHEN METABOLIC PANEL: CPT

## 2023-08-18 PROCEDURE — 86304 IMMUNOASSAY TUMOR CA 125: CPT

## 2023-08-18 PROCEDURE — 6370000000 HC RX 637 (ALT 250 FOR IP): Performed by: HOSPITALIST

## 2023-08-18 PROCEDURE — 2500000003 HC RX 250 WO HCPCS: Performed by: NURSE PRACTITIONER

## 2023-08-18 PROCEDURE — 86300 IMMUNOASSAY TUMOR CA 15-3: CPT

## 2023-08-18 PROCEDURE — 85732 THROMBOPLASTIN TIME PARTIAL: CPT

## 2023-08-18 PROCEDURE — 82728 ASSAY OF FERRITIN: CPT

## 2023-08-18 PROCEDURE — 82306 VITAMIN D 25 HYDROXY: CPT

## 2023-08-18 PROCEDURE — 82378 CARCINOEMBRYONIC ANTIGEN: CPT

## 2023-08-18 PROCEDURE — 1100000003 HC PRIVATE W/ TELEMETRY

## 2023-08-18 PROCEDURE — 85025 COMPLETE CBC W/AUTO DIFF WBC: CPT

## 2023-08-18 PROCEDURE — 93970 EXTREMITY STUDY: CPT

## 2023-08-18 PROCEDURE — APPSS60 APP SPLIT SHARED TIME 46-60 MINUTES: Performed by: NURSE PRACTITIONER

## 2023-08-18 PROCEDURE — 86146 BETA-2 GLYCOPROTEIN ANTIBODY: CPT

## 2023-08-18 PROCEDURE — 99222 1ST HOSP IP/OBS MODERATE 55: CPT | Performed by: INTERNAL MEDICINE

## 2023-08-18 PROCEDURE — 86301 IMMUNOASSAY TUMOR CA 19-9: CPT

## 2023-08-18 PROCEDURE — 85520 HEPARIN ASSAY: CPT

## 2023-08-18 PROCEDURE — 36415 COLL VENOUS BLD VENIPUNCTURE: CPT

## 2023-08-18 PROCEDURE — 86147 CARDIOLIPIN ANTIBODY EA IG: CPT

## 2023-08-18 PROCEDURE — 83540 ASSAY OF IRON: CPT

## 2023-08-18 RX ORDER — FEBUXOSTAT 40 MG/1
40 TABLET, FILM COATED ORAL DAILY
Status: DISCONTINUED | OUTPATIENT
Start: 2023-08-19 | End: 2023-08-21 | Stop reason: HOSPADM

## 2023-08-18 RX ORDER — HYDROCODONE BITARTRATE AND ACETAMINOPHEN 10; 325 MG/1; MG/1
1 TABLET ORAL EVERY 4 HOURS PRN
Status: DISCONTINUED | OUTPATIENT
Start: 2023-08-18 | End: 2023-08-21 | Stop reason: HOSPADM

## 2023-08-18 RX ORDER — FEBUXOSTAT 40 MG/1
80 TABLET, FILM COATED ORAL DAILY
COMMUNITY

## 2023-08-18 RX ORDER — POTASSIUM CHLORIDE 20 MEQ/1
40 TABLET, EXTENDED RELEASE ORAL 2 TIMES DAILY WITH MEALS
Status: COMPLETED | OUTPATIENT
Start: 2023-08-18 | End: 2023-08-19

## 2023-08-18 RX ADMIN — GABAPENTIN 600 MG: 300 CAPSULE ORAL at 21:44

## 2023-08-18 RX ADMIN — POTASSIUM CHLORIDE 40 MEQ: 1500 TABLET, EXTENDED RELEASE ORAL at 16:16

## 2023-08-18 RX ADMIN — HEPARIN SODIUM 10 UNITS/KG/HR: 10000 INJECTION, SOLUTION INTRAVENOUS at 11:08

## 2023-08-18 RX ADMIN — HYDROCODONE BITARTRATE AND ACETAMINOPHEN 1 TABLET: 10; 325 TABLET ORAL at 11:42

## 2023-08-18 RX ADMIN — PREDNISONE 5 MG: 5 TABLET ORAL at 21:44

## 2023-08-18 RX ADMIN — PREDNISONE 5 MG: 5 TABLET ORAL at 09:02

## 2023-08-18 RX ADMIN — POTASSIUM CHLORIDE 40 MEQ: 1500 TABLET, EXTENDED RELEASE ORAL at 09:01

## 2023-08-18 RX ADMIN — GABAPENTIN 300 MG: 300 CAPSULE ORAL at 09:02

## 2023-08-18 RX ADMIN — BRIMONIDINE TARTRATE 1 DROP: 2 SOLUTION/ DROPS OPHTHALMIC at 21:45

## 2023-08-18 RX ADMIN — LATANOPROST 1 DROP: 50 SOLUTION OPHTHALMIC at 21:44

## 2023-08-18 RX ADMIN — ONDANSETRON 4 MG: 4 TABLET, ORALLY DISINTEGRATING ORAL at 09:01

## 2023-08-18 NOTE — WOUND CARE
Pt seen for LLE traumatic wound, pt states that she fell about 1 month ago and it opened an area on the posterior aspect of her Left leg. Pt states she has been using opticel AG covered with ABD and secured with rolled gauze and coban. Noted LLE with significant swelling, and per chart review pt with bilateral DVT's. First attempt to see patient pt states that it is not normally changed until Saturday and would like to wait, pt agreed to have it changed today however wanted to have supplies at bedside before removal, obtained supplies and pt receiving an ECHO at this time, then returned later around 1400 and pt off floor for ultrasound. Spoke with primary RN informed that attempted to see patient 3 times today and supplies are in the room, Primary RN agreed to change dressing with opticel AG over wound bed covered with ABD pads and rolled gauze, stop use of coban at this time due to DVT. Wound team will follow up next week for full eval of wound, pictures from outside facility on chart. Wound team will continue to follow while in acute care setting.

## 2023-08-18 NOTE — CONSULTS
Asked to see about RLL pulm nodule. I would ask pulmonary to see if this lesion is amenable to navigational bronch. There seems to be a close bronchus. However if they don't think its possible, then we could do a percutaneous biopsy. Also during my review of the ct, the rib lesions may simply be previous fractures and a sternal bone island. None of them were pet avid on May 2023 PET/ct.

## 2023-08-18 NOTE — CONSULTS
[facility] Hematology & Oncology        Inpatient Hematology / Oncology Consult    Reason for Consult:  Pulmonary embolism and infarction [facility]) [I26.99]  Pulmonary embolism on right [facility]) [I26.99]  Acute kidney injury ([address]) [N17.9]  Referring Physician:  Lavon Rodgers MD    History of Present Illness:  Ms. Leigh Munoz is a 39 y.o. female admitted on 8/17/2023. The primary encounter diagnosis was Pulmonary embolism on right [facility]). A diagnosis of Acute kidney injury [facility]) was also pertinent to this visit. Jimmie Closs Her PMH includes SLE, lupus nephritis s/p biopsy in 2018, DVT 2019, renal vein thrombosis 2010, CKD3, obesity, and gout. She reports being on warfarin for \"years\" and it was discontinued in 5/2023. She was hospitalized in [facility] in 5/2023 s/p fall from tripping over her dog with traumatic LLE laceration with significant blood loss. Warfarin was discontinued at that time. She required multiple OR debridements, wound VAC placements, and application of a TheraSkin skin substitute. Wound was infected and required abx. Incidental findings on work up during that time showed RLL pulmonary nodule measuring 15 mm, indeterminate splenic lesion, hepatic lesion, sclerotic lesions of sternum and T3 vertebral body, suspect osseous metastatic disease left parietal calvarium. Radiologist felt the lesion was most probably malignantr and metastatic recommending OP MRI and bone scan. She reports having a PET scan in [facility] (results not available on Care Everywhere). Imaging for head trauma on 8/3 found a L-sided parietal mass and she was referred to NS. NS ordered CT CAP perfomed yesterday, 8/17 which revealed RLL PE with likely infarction, irregular density in R breast, asymmetrical enlargement of the R ovary, liver lesions, 1.5cm RLL pulm nodule, and bone lesions. She was directed to the ED and started on Heparin gtt. Warfarin has been resumed. BLE dopp and TTE pending.   IR was consulted and recommends only

## 2023-08-19 PROBLEM — R68.89 SUSPECTED MALIGNANT NEOPLASM: Status: ACTIVE | Noted: 2023-08-19

## 2023-08-19 LAB
ANION GAP SERPL CALC-SCNC: 6 MMOL/L (ref 2–11)
B2 GLYCOPROT1 IGA SER-ACNC: <9 GPI IGA UNITS (ref 0–25)
B2 GLYCOPROT1 IGA SER-ACNC: <9 GPI IGA UNITS (ref 0–25)
B2 GLYCOPROT1 IGG SER-ACNC: <9 GPI IGG UNITS (ref 0–20)
B2 GLYCOPROT1 IGG SER-ACNC: <9 GPI IGG UNITS (ref 0–20)
B2 GLYCOPROT1 IGM SER-ACNC: <9 GPI IGM UNITS (ref 0–32)
B2 GLYCOPROT1 IGM SER-ACNC: <9 GPI IGM UNITS (ref 0–32)
BUN SERPL-MCNC: 33 MG/DL (ref 6–23)
CALCIUM SERPL-MCNC: 8.3 MG/DL (ref 8.3–10.4)
CANCER AG27-29 SERPL-ACNC: 10.3 U/ML (ref 0–38.6)
CARDIOLIPIN IGA SER IA-ACNC: <9 APL U/ML (ref 0–11)
CARDIOLIPIN IGG SER IA-ACNC: <9 GPL U/ML (ref 0–14)
CARDIOLIPIN IGM SER IA-ACNC: <9 MPL U/ML (ref 0–12)
CHLORIDE SERPL-SCNC: 108 MMOL/L (ref 101–110)
CO2 SERPL-SCNC: 29 MMOL/L (ref 21–32)
CREAT SERPL-MCNC: 1.6 MG/DL (ref 0.6–1)
ERYTHROCYTE [DISTWIDTH] IN BLOOD BY AUTOMATED COUNT: 16.6 % (ref 11.9–14.6)
GLUCOSE SERPL-MCNC: 100 MG/DL (ref 65–100)
HCT VFR BLD AUTO: 28.8 % (ref 35.8–46.3)
HGB BLD-MCNC: 8.7 G/DL (ref 11.7–15.4)
MCH RBC QN AUTO: 30.6 PG (ref 26.1–32.9)
MCHC RBC AUTO-ENTMCNC: 30.2 G/DL (ref 31.4–35)
MCV RBC AUTO: 101.4 FL (ref 82–102)
MM INDURATION, POC: 0 MM (ref 0–5)
NRBC # BLD: 0.02 K/UL (ref 0–0.2)
PLATELET # BLD AUTO: 191 K/UL (ref 150–450)
PMV BLD AUTO: 8.7 FL (ref 9.4–12.3)
POTASSIUM SERPL-SCNC: 3.7 MMOL/L (ref 3.5–5.1)
PPD, POC: NEGATIVE
RBC # BLD AUTO: 2.84 M/UL (ref 4.05–5.2)
SODIUM SERPL-SCNC: 143 MMOL/L (ref 133–143)
UFH PPP CHRO-ACNC: 0.66 IU/ML (ref 0.3–0.7)
UFH PPP CHRO-ACNC: 0.67 IU/ML (ref 0.3–0.7)
WBC # BLD AUTO: 8.4 K/UL (ref 4.3–11.1)

## 2023-08-19 PROCEDURE — 2500000003 HC RX 250 WO HCPCS: Performed by: NURSE PRACTITIONER

## 2023-08-19 PROCEDURE — 85520 HEPARIN ASSAY: CPT

## 2023-08-19 PROCEDURE — 6370000000 HC RX 637 (ALT 250 FOR IP): Performed by: HOSPITALIST

## 2023-08-19 PROCEDURE — 97530 THERAPEUTIC ACTIVITIES: CPT

## 2023-08-19 PROCEDURE — 80048 BASIC METABOLIC PNL TOTAL CA: CPT

## 2023-08-19 PROCEDURE — 36415 COLL VENOUS BLD VENIPUNCTURE: CPT

## 2023-08-19 PROCEDURE — 6370000000 HC RX 637 (ALT 250 FOR IP): Performed by: FAMILY MEDICINE

## 2023-08-19 PROCEDURE — 99223 1ST HOSP IP/OBS HIGH 75: CPT | Performed by: INTERNAL MEDICINE

## 2023-08-19 PROCEDURE — 2580000003 HC RX 258: Performed by: FAMILY MEDICINE

## 2023-08-19 PROCEDURE — 97165 OT EVAL LOW COMPLEX 30 MIN: CPT

## 2023-08-19 PROCEDURE — 1100000003 HC PRIVATE W/ TELEMETRY

## 2023-08-19 PROCEDURE — 97161 PT EVAL LOW COMPLEX 20 MIN: CPT

## 2023-08-19 PROCEDURE — 97535 SELF CARE MNGMENT TRAINING: CPT

## 2023-08-19 PROCEDURE — 85027 COMPLETE CBC AUTOMATED: CPT

## 2023-08-19 RX ADMIN — GABAPENTIN 300 MG: 300 CAPSULE ORAL at 08:14

## 2023-08-19 RX ADMIN — HEPARIN SODIUM 10 UNITS/KG/HR: 10000 INJECTION, SOLUTION INTRAVENOUS at 04:15

## 2023-08-19 RX ADMIN — BRIMONIDINE TARTRATE 1 DROP: 2 SOLUTION/ DROPS OPHTHALMIC at 20:57

## 2023-08-19 RX ADMIN — ONDANSETRON 4 MG: 4 TABLET, ORALLY DISINTEGRATING ORAL at 00:05

## 2023-08-19 RX ADMIN — SODIUM CHLORIDE, PRESERVATIVE FREE 10 ML: 5 INJECTION INTRAVENOUS at 08:15

## 2023-08-19 RX ADMIN — GABAPENTIN 600 MG: 300 CAPSULE ORAL at 20:54

## 2023-08-19 RX ADMIN — LATANOPROST 1 DROP: 50 SOLUTION OPHTHALMIC at 20:57

## 2023-08-19 RX ADMIN — PREDNISONE 5 MG: 5 TABLET ORAL at 08:14

## 2023-08-19 RX ADMIN — POTASSIUM CHLORIDE 40 MEQ: 1500 TABLET, EXTENDED RELEASE ORAL at 08:14

## 2023-08-19 RX ADMIN — BRIMONIDINE TARTRATE 1 DROP: 2 SOLUTION/ DROPS OPHTHALMIC at 08:15

## 2023-08-19 NOTE — H&P
Hospitalist   Admit Date:  2023  3:57 PM   Name:  John Macias   Age:  39 y.o. Sex:  female  :  1980   MRN:  [id number]   Room:  2/    Presenting/Chief Complaint: Other (Pulmonary embolism)     Reason(s) for Admission: Pulmonary embolism and infarction [facility]) [I26.99]  Pulmonary embolism on right ([address]) [I26.99]  Acute kidney injury ([address]) [N17.9]     History of Present Illness:   John Macias is a 39 y.o. female with medical history of SLE, lupus nephritis s/p biopsy in 2018,  DVT,  renal thrombus, obesity, gout  who presented with cc pulmonary embolism and right lung infarction on outpatient CT. She was seen by neurosurgery on 8/3 for evaluation of a left sided parietal skull tumor when she presented to ED in OSH for head trauma. Hospitalized at [facility] after a fall from standing when tripped over her dog on 23 with traumatic  LLE laceration w ith significant blood loss. She was maintained on anticoagulation. . Patient had multiple OR debridements, wound VAC placements, and application of a TheraSkin skin substitute. Wound was infected and required abx. Her warfarin was discontinued. She required a blood trans fusion and rehab stay. Incidental findings on work up during that time showed RLL pulmonary nodule measuring 15 mm, indeterminate splenic lesion, hepatic lesion, sclerotic lesions of sternum and T3 vertebral body, suspect osseous metastatic disease left parietal calvarium. Radiologist felt the lesion was most probably malignantr and metastatic recommending OP MRI and bone scan. So CT chest/abd/pelvis were obtained for further cancer work up. She was notified that the CT revealed RLL PE and likely a RLL infarction and was instrued to go to ER for further eval. CT also showed irregular desnisty with in the right upper rbeast and additonal asymmetric enlarge of the R ovary. Lastly a 1.5 cm right lower lobe nodule and sclerotic and expansile rib lesions possible osseious metastases.  In ED,
ml/min/1.73m2    Calcium 8.5 8.3 - 10.4 MG/DL    Total Bilirubin 0.4 0.2 - 1.1 MG/DL    ALT 19 12 - 65 U/L    AST 23 15 - 37 U/L    Alk Phosphatase 95 50 - 136 U/L    Total Protein 6.5 6.3 - 8.2 g/dL    Albumin 2.9 (L) 3.5 - 5.0 g/dL    Globulin 3.6 2.8 - 4.5 g/dL    Albumin/Globulin Ratio 0.8 0.4 - 1.6     CBC with Auto Differential    Collection Time: 08/17/23  4:00 PM   Result Value Ref Range    WBC 13.9 (H) 4.3 - 11.1 K/uL    RBC 3.61 (L) 4.05 - 5.2 M/uL    Hemoglobin 11.0 (L) 11.7 - 15.4 g/dL    Hematocrit 35.9 35.8 - 46.3 %    MCV 99.4 82 - 102 FL    MCH 30.5 26.1 - 32.9 PG    MCHC 30.6 (L) 31.4 - 35.0 g/dL    RDW 16.8 (H) 11.9 - 14.6 %    Platelets 420 700 - 887 K/uL    MPV 9.0 (L) 9.4 - 12.3 FL    nRBC 0.00 0.0 - 0.2 K/uL    Differential Type AUTOMATED      Neutrophils % 76 43 - 78 %    Lymphocytes % 15 13 - 44 %    Monocytes % 7 4.0 - 12.0 %    Eosinophils % 0 (L) 0.5 - 7.8 %    Basophils % 0 0.0 - 2.0 %    Immature Granulocytes 2 0.0 - 5.0 %    Neutrophils Absolute 10.6 (H) 1.7 - 8.2 K/UL    Lymphocytes Absolute 2.0 0.5 - 4.6 K/UL    Monocytes Absolute 1.0 0.1 - 1.3 K/UL    Eosinophils Absolute 0.0 0.0 - 0.8 K/UL    Basophils Absolute 0.1 0.0 - 0.2 K/UL    Absolute Immature Granulocyte 0.3 0.0 - 0.5 K/UL   Magnesium    Collection Time: 08/17/23  4:00 PM   Result Value Ref Range    Magnesium 2.4 1.8 - 2.4 mg/dL   Protime-INR    Collection Time: 08/17/23  4:00 PM   Result Value Ref Range    Protime 12.8 12.6 - 14.3 sec    INR 0.9     Troponin    Collection Time: 08/17/23  4:00 PM   Result Value Ref Range    Troponin, High Sensitivity 15.1 (H) 0 - 14 pg/mL   Urinalysis    Collection Time: 08/17/23  4:27 PM   Result Value Ref Range    Color, UA YELLOW/STRAW      Appearance CLEAR      Specific Gravity, UA 1.031 (H) 1.001 - 1.023      pH, Urine 5.5 5.0 - 9.0      Protein, UA TRACE (A) NEG mg/dL    Glucose, UA Negative mg/dL    Ketones, Urine Negative NEG mg/dL    Bilirubin Urine Negative NEG      Blood, Urine
clinician by perfect serve at the time of interpretation with confirmation of receipt.         Signed:  Bay Sommers MD

## 2023-08-19 NOTE — CONSULTS
History and Physical Initial Visit NOTE           8/19/2023    Ana Maldonado                        Date of Admission:  8/17/2023    The patient's chart is reviewed and the patient is discussed with the staff. Subjective:     Patient is a 39 y.o.  female seen and evaluated at the request of Dr. Leanne Hale for possible RLL biopsy. Past Medical History: SLE (on plaquenil, methotrexate, and prednisone), lupus nephritis s/p biopsy in 2018, DVT (2019), renal vein thrombus (2010), obesity, and gout. Patient was told to go to the ER by Dr. Devin Bustillo due to a right-sided pulmonary embolism noted on CT scan 8/17/23. Patient was noted to have a L parietal skull tumor on 05/26/23 after going to the ED after falling (tripped over dog) and presenting for LLE laceration. A RLL pulmonary nodule of 15 mm, interdeterminate splenic lesion, hepatic lesion, and sclerotic lesion of the sternum and T3 vertebral body, irregular density of the right breast and asymmetric enlargement of the right ovary were also noted at this visit. CT chest/abdomen and pelvis were obtained for for there cancer workup and results. It appears her blood thinner (warfarin) had been held after this ER visit. Patient notes increased SOB. Patient has not been keeping up on cancer screenings, including pap smears and mammograms. Extensive bilateral DVT found on bilateral LE duplex on 8/18/23. Review of Systems  Denies shortness of breath. Constitutional: negative for fever, chills, sweats  Cardiovascular: negative for chest pain, palpitations, syncope, edema  Gastrointestinal:  negative for dysphagia, reflux, vomiting, diarrhea, abdominal pain, or melena  Neurologic:  negative for focal weakness, numbness, headache      Current Outpatient Medications   Medication Instructions    acetaminophen (TYLENOL) 325 MG tablet TAKE 1 TABLETS (650 MG) BY ORAL ROUTE EVERY 6 HOURS AS NEEDED FOR FEVER/PAIN.     ascorbic acid (VITAMIN C) 500 MG

## 2023-08-20 LAB — UFH PPP CHRO-ACNC: 0.62 IU/ML (ref 0.3–0.7)

## 2023-08-20 PROCEDURE — 2500000003 HC RX 250 WO HCPCS: Performed by: NURSE PRACTITIONER

## 2023-08-20 PROCEDURE — 6370000000 HC RX 637 (ALT 250 FOR IP): Performed by: FAMILY MEDICINE

## 2023-08-20 PROCEDURE — 36415 COLL VENOUS BLD VENIPUNCTURE: CPT

## 2023-08-20 PROCEDURE — 1100000003 HC PRIVATE W/ TELEMETRY

## 2023-08-20 PROCEDURE — 2580000003 HC RX 258: Performed by: FAMILY MEDICINE

## 2023-08-20 PROCEDURE — 85520 HEPARIN ASSAY: CPT

## 2023-08-20 RX ADMIN — LATANOPROST 1 DROP: 50 SOLUTION OPHTHALMIC at 20:34

## 2023-08-20 RX ADMIN — HEPARIN SODIUM 10 UNITS/KG/HR: 10000 INJECTION, SOLUTION INTRAVENOUS at 20:57

## 2023-08-20 RX ADMIN — PREDNISONE 5 MG: 5 TABLET ORAL at 20:33

## 2023-08-20 RX ADMIN — FEBUXOSTAT 40 MG: 40 TABLET, FILM COATED ORAL at 20:35

## 2023-08-20 RX ADMIN — BRIMONIDINE TARTRATE 1 DROP: 2 SOLUTION/ DROPS OPHTHALMIC at 20:34

## 2023-08-20 RX ADMIN — PREDNISONE 5 MG: 5 TABLET ORAL at 08:35

## 2023-08-20 RX ADMIN — SODIUM CHLORIDE, PRESERVATIVE FREE 10 ML: 5 INJECTION INTRAVENOUS at 10:10

## 2023-08-20 RX ADMIN — SODIUM CHLORIDE, PRESERVATIVE FREE 10 ML: 5 INJECTION INTRAVENOUS at 20:07

## 2023-08-20 RX ADMIN — GABAPENTIN 600 MG: 300 CAPSULE ORAL at 20:33

## 2023-08-20 RX ADMIN — HEPARIN SODIUM 10 UNITS/KG/HR: 10000 INJECTION, SOLUTION INTRAVENOUS at 02:29

## 2023-08-20 RX ADMIN — BRIMONIDINE TARTRATE 1 DROP: 2 SOLUTION/ DROPS OPHTHALMIC at 08:36

## 2023-08-20 RX ADMIN — GABAPENTIN 300 MG: 300 CAPSULE ORAL at 08:35

## 2023-08-21 ENCOUNTER — TRANSCRIBE ORDERS (OUTPATIENT)
Dept: INTERVENTIONAL RADIOLOGY/VASCULAR | Age: 43
End: 2023-08-21

## 2023-08-21 VITALS
RESPIRATION RATE: 20 BRPM | DIASTOLIC BLOOD PRESSURE: 80 MMHG | TEMPERATURE: 97.5 F | WEIGHT: 293 LBS | HEIGHT: 67 IN | BODY MASS INDEX: 45.99 KG/M2 | OXYGEN SATURATION: 98 % | HEART RATE: 72 BPM | SYSTOLIC BLOOD PRESSURE: 129 MMHG

## 2023-08-21 DIAGNOSIS — R91.8 LUNG MASS: Primary | ICD-10-CM

## 2023-08-21 LAB
CANCER AG125 SERPL-ACNC: 20 U/ML (ref 1.5–35)
CANCER AG15-3 SERPL-ACNC: 9.9 U/ML (ref 1–35)
CANCER AG19-9 SERPL-ACNC: 23.8 U/ML (ref 2–37)
ERYTHROCYTE [DISTWIDTH] IN BLOOD BY AUTOMATED COUNT: 16.2 % (ref 11.9–14.6)
HCT VFR BLD AUTO: 28.9 % (ref 35.8–46.3)
HGB BLD-MCNC: 8.6 G/DL (ref 11.7–15.4)
MCH RBC QN AUTO: 30.2 PG (ref 26.1–32.9)
MCHC RBC AUTO-ENTMCNC: 29.8 G/DL (ref 31.4–35)
MCV RBC AUTO: 101.4 FL (ref 82–102)
NRBC # BLD: 0.03 K/UL (ref 0–0.2)
PLATELET # BLD AUTO: 230 K/UL (ref 150–450)
PMV BLD AUTO: 9.1 FL (ref 9.4–12.3)
RBC # BLD AUTO: 2.85 M/UL (ref 4.05–5.2)
UFH PPP CHRO-ACNC: 0.51 IU/ML (ref 0.3–0.7)
WBC # BLD AUTO: 13.4 K/UL (ref 4.3–11.1)

## 2023-08-21 PROCEDURE — 6360000002 HC RX W HCPCS: Performed by: HOSPITALIST

## 2023-08-21 PROCEDURE — 6370000000 HC RX 637 (ALT 250 FOR IP): Performed by: FAMILY MEDICINE

## 2023-08-21 PROCEDURE — 36415 COLL VENOUS BLD VENIPUNCTURE: CPT

## 2023-08-21 PROCEDURE — 85520 HEPARIN ASSAY: CPT

## 2023-08-21 PROCEDURE — 85027 COMPLETE CBC AUTOMATED: CPT

## 2023-08-21 RX ORDER — ENOXAPARIN SODIUM 150 MG/ML
1 INJECTION SUBCUTANEOUS 2 TIMES DAILY
Status: DISCONTINUED | OUTPATIENT
Start: 2023-08-21 | End: 2023-08-21 | Stop reason: HOSPADM

## 2023-08-21 RX ORDER — BACITRACIN ZINC 500 [USP'U]/G
OINTMENT TOPICAL DAILY
Status: DISCONTINUED | OUTPATIENT
Start: 2023-08-21 | End: 2023-08-21

## 2023-08-21 RX ORDER — GINSENG 100 MG
CAPSULE ORAL 3 TIMES DAILY
Status: DISCONTINUED | OUTPATIENT
Start: 2023-08-21 | End: 2023-08-21 | Stop reason: HOSPADM

## 2023-08-21 RX ORDER — ENOXAPARIN SODIUM 150 MG/ML
1 INJECTION SUBCUTANEOUS 2 TIMES DAILY
Qty: 60 ML | Refills: 0 | Status: SHIPPED | OUTPATIENT
Start: 2023-08-21 | End: 2023-09-20

## 2023-08-21 RX ADMIN — ENOXAPARIN SODIUM 135 MG: 150 INJECTION SUBCUTANEOUS at 13:37

## 2023-08-21 RX ADMIN — BRIMONIDINE TARTRATE 1 DROP: 2 SOLUTION/ DROPS OPHTHALMIC at 08:20

## 2023-08-21 RX ADMIN — GABAPENTIN 300 MG: 300 CAPSULE ORAL at 08:19

## 2023-08-21 RX ADMIN — PREDNISONE 5 MG: 5 TABLET ORAL at 08:19

## 2023-08-21 RX ADMIN — HYDROXYCHLOROQUINE SULFATE 200 MG: 200 TABLET ORAL at 08:18

## 2023-08-21 NOTE — CARE COORDINATION
Pt chart reviewed for discharge planning. CM met with pt at bedside, verified demographic information/ health insurance. Pt lives with spouse in one level home, is independent with ADLs, ambulates with a walker as needed, and drives. PCP was confirmed, last seen in office a couple of weeks ago and receives [address] in the home once a week for therapy. CM will follow pt plan of care and assist with supportive care referrals pending pt clinical progress. Please consult case management if specific needs arise. 08/21/23 1155   Service Assessment   Patient Orientation Alert and Oriented   Cognition Alert   History Provided By Patient   Primary Caregiver Self   Support Systems Spouse/Significant Other   Patient's Healthcare Decision Maker is: Legal Next of Kin   PCP Verified by CM Yes   Last Visit to PCP Within last 3 months   Prior Functional Level Independent in ADLs/IADLs   Current Functional Level Independent in ADLs/IADLs   Can patient return to prior living arrangement Yes   Ability to make needs known: Good   Family able to assist with home care needs: Yes   Would you like for me to discuss the discharge plan with any other family members/significant others, and if so, who? Yes  (Spouse)   Financial Resources Medicare  ([employer])   Community Resources None   Social/Functional History   Lives With Spouse   Type of [facility]  One level   Home Access Ramped entrance   Bathroom Shower/Tub   (sink baths)   Bathroom Toilet Handicap height   Bathroom Equipment 3-in-1 commode   Home Equipment Walker, [address] Needs assistance   Ambulation Assistance Independent   Active  Yes   Occupation On disability   Discharge Planning   Type of Residence House   Living Arrangements Spouse/Significant Other   Current Services Prior To Admission [id number] Needed N/A   DME Ordered?  No   Potential Assistance Purchasing Medications No

## 2023-08-21 NOTE — DISCHARGE SUMMARY
CHANGED    Details   febuxostat (ULORIC) 40 MG TABS tablet Take 2 tablets by mouth daily      acetaminophen (TYLENOL) 325 MG tablet TAKE 1 TABLETS (650 MG) BY ORAL ROUTE EVERY 6 HOURS AS NEEDED FOR FEVER/PAIN. ascorbic acid (VITAMIN C) 500 MG tablet Take 1 tablet every day by oral route. bacitracin 500 UNIT/GM ointment APPLY TOPICALLY TO AFFECTED AREA BID      brimonidine (ALPHAGAN) 0.2 % ophthalmic solution ADMINISTER 1 DROP TO THE RIGHT EYE BID. bumetanide (BUMEX) 2 MG tablet Take 1 tablet by mouth daily      vitamin D (ERGOCALCIFEROL) 1.25 MG (31067 UT) CAPS capsule       ferrous sulfate (IRON 325) 325 (65 Fe) MG tablet TAKE 1 TABLET (325 MG) BY ORAL ROUTE ONCE DAILY WITH BREAKFAST. folic acid 5 MG/ML injection folic acid Take No date recorded No form recorded No frequency recorded No route recorded No set duration recorded No set duration amount recorded active No dosage strength recorded No dosage strength units of measure recorded      gabapentin (NEURONTIN) 300 MG capsule       HYDROcodone-acetaminophen (NORCO)  MG per tablet Take 1 tablet by mouth every 6 hours as needed. hydroxychloroquine (PLAQUENIL) 200 MG tablet TAKE 1 TABLET BY MOUTH 3 TIMES A WEEK; MONDAY, TUESDAY, AND WEDNESDAY. latanoprost (XALATAN) 0.005 % ophthalmic solution       omeprazole (PRILOSEC) 20 MG delayed release capsule Take 1 capsule every day by oral route.       predniSONE 5 MG/5ML solution prednisone Take No date recorded No form recorded No frequency recorded No route recorded No set duration recorded No set duration amount recorded active No dosage strength recorded No dosage strength units of measure recorded           STOP taking these medications       nystatin (MYCOSTATIN) 501655 UNIT/GM powder Comments:   Reason for Stopping:         potassium chloride (KLOR-CON M) 20 MEQ extended release tablet Comments:   Reason for Stopping:               Some medications may have been reported

## 2023-08-21 NOTE — WOUND CARE
LLE using alginate with silver, patient prefers her dressing from home, spouse changed Saturday and is not due until tomorrow, would not allow my assessment today asked for return tomorrow. Not using coban for compression, just to hold bandage in place. Right upper arm skin tear (old)using bacitracin, telfa and marie/ coban daily. Left upper arm skin tear (old) using same as right. Both dressing today, both arm skin tears healing well. Orders added for this as well. Flavio Marie notified patient using bacitracin, order to add to inpatient orders. Will monitor.

## 2023-08-23 ENCOUNTER — OFFICE VISIT (OUTPATIENT)
Dept: NEUROSURGERY | Age: 43
End: 2023-08-23
Payer: MEDICARE

## 2023-08-23 VITALS
TEMPERATURE: 96.8 F | HEART RATE: 87 BPM | DIASTOLIC BLOOD PRESSURE: 79 MMHG | OXYGEN SATURATION: 98 % | SYSTOLIC BLOOD PRESSURE: 136 MMHG | BODY MASS INDEX: 46.2 KG/M2 | HEIGHT: 67 IN

## 2023-08-23 DIAGNOSIS — C41.0 MALIGNANT NEOPLASM OF BONES OF SKULL AND FACE (HCC): Primary | ICD-10-CM

## 2023-08-23 PROCEDURE — 99214 OFFICE O/P EST MOD 30 MIN: CPT | Performed by: NEUROLOGICAL SURGERY

## 2023-08-23 PROCEDURE — 1111F DSCHRG MED/CURRENT MED MERGE: CPT | Performed by: NEUROLOGICAL SURGERY

## 2023-08-23 PROCEDURE — 1036F TOBACCO NON-USER: CPT | Performed by: NEUROLOGICAL SURGERY

## 2023-08-23 PROCEDURE — G8417 CALC BMI ABV UP PARAM F/U: HCPCS | Performed by: NEUROLOGICAL SURGERY

## 2023-08-23 PROCEDURE — G8427 DOCREV CUR MEDS BY ELIG CLIN: HCPCS | Performed by: NEUROLOGICAL SURGERY

## 2023-08-23 PROCEDURE — 3075F SYST BP GE 130 - 139MM HG: CPT | Performed by: NEUROLOGICAL SURGERY

## 2023-08-23 PROCEDURE — 3078F DIAST BP <80 MM HG: CPT | Performed by: NEUROLOGICAL SURGERY

## 2023-08-26 LAB
LA 2 SCREEN W REFLEX-IMP: NORMAL
SCREEN APTT: 33.5 SEC (ref 0–43.5)
SCREEN DRVVT: 43.3 SEC (ref 0–47)

## 2023-08-31 ENCOUNTER — HOSPITAL ENCOUNTER (OUTPATIENT)
Dept: MAMMOGRAPHY | Age: 43
End: 2023-08-31
Payer: MEDICARE

## 2023-08-31 ENCOUNTER — HOSPITAL ENCOUNTER (OUTPATIENT)
Dept: MAMMOGRAPHY | Age: 43
Discharge: HOME | End: 2023-08-31
Payer: MEDICARE

## 2023-08-31 VITALS — HEIGHT: 67 IN | WEIGHT: 289 LBS | BODY MASS INDEX: 45.36 KG/M2

## 2023-08-31 DIAGNOSIS — N63.12 MASS OF UPPER INNER QUADRANT OF RIGHT BREAST: ICD-10-CM

## 2023-08-31 PROCEDURE — 77066 DX MAMMO INCL CAD BI: CPT

## 2023-08-31 PROCEDURE — 76642 ULTRASOUND BREAST LIMITED: CPT

## 2023-09-04 NOTE — PROGRESS NOTES
LYMPHOPCT 11 (L) 09/06/2023    LYMPHSABS 1.3 09/06/2023    MONOPCT 6 09/06/2023    MONOSABS 0.8 09/06/2023    EOSABS 0.0 09/06/2023    BASOPCT 0 09/06/2023     Lab Results   Component Value Date     09/06/2023    K 3.5 09/06/2023     09/06/2023    CO2 27 09/06/2023    BUN 20 09/06/2023    CREATININE 1.60 (H) 09/06/2023    GLUCOSE 120 (H) 09/06/2023    CALCIUM 8.7 09/06/2023    PROT 6.8 09/06/2023    LABALBU 3.0 (L) 09/06/2023    BILITOT 0.4 09/06/2023    ALKPHOS 77 09/06/2023    AST 22 09/06/2023    ALT 18 09/06/2023    LABGLOM 41 (L) 09/06/2023    GLOB 3.8 09/06/2023     Lab Results   Component Value Date    IRON 93 08/18/2023    TIBC 222 (L) 08/18/2023    FERRITIN 135 08/18/2023 8/18/23:  CA 15-3, 27.29,125 and 19-9 WNL  CEA mildly elevated at 3.1  B2GP and ACL abs negative  LAC testing negative      PET/CT: outside report in [facility]  5/10/23:  FINDINGS:   NECK: Physiologic activity is seen within the neck including the tongue. There   is opacification of the left maxillary sinus suggestive of mucosal sinus   disease. No appreciable adenopathy. CHEST: Physiologic activity is seen within the mediastinum. There is patchy   scarring and atelectatic change seen in both lower lobes with fibrosis. There is   a stable nonmetabolically active right lower lobe 1.5 x 1.5 cm pulmonary nodule. Mildly active serpiginous rounded foci in the medial right breast suggestive of   FAT necrosis with maximum SUV of 1.0. The remaining breast tissue appears   unremarkable. ABDOMEN: Physiologic liver, spleen, renal collecting system and bowel activity. Radiolucent mass in the spleen most suggestive of cystic features measuring 4.9   x 3.9 cm. Unremarkable anterior abdominal wall. Scarring of both kidneys right   greater than left. PELVIS: Physiologic colonic and bowel activity.      AXIAL SKELETON:  There is a lytic left calvarial lesion measuring 3.1 x 1.1 cm   within the right and left parietal

## 2023-09-05 ENCOUNTER — HOSPITAL ENCOUNTER (OUTPATIENT)
Dept: GENERAL RADIOLOGY | Age: 43
Discharge: HOME | End: 2023-09-08
Attending: HOSPITALIST
Payer: MEDICARE

## 2023-09-05 ENCOUNTER — HOSPITAL ENCOUNTER (OUTPATIENT)
Dept: CT IMAGING | Age: 43
Discharge: HOME | End: 2023-09-08
Attending: HOSPITALIST
Payer: MEDICARE

## 2023-09-05 VITALS
HEART RATE: 76 BPM | HEIGHT: 67 IN | SYSTOLIC BLOOD PRESSURE: 135 MMHG | BODY MASS INDEX: 45.36 KG/M2 | WEIGHT: 289 LBS | DIASTOLIC BLOOD PRESSURE: 75 MMHG | TEMPERATURE: 97.1 F | OXYGEN SATURATION: 100 % | RESPIRATION RATE: 16 BRPM

## 2023-09-05 DIAGNOSIS — R91.8 LUNG MASS: ICD-10-CM

## 2023-09-05 PROCEDURE — 6360000002 HC RX W HCPCS: Performed by: RADIOLOGY

## 2023-09-05 PROCEDURE — 2500000003 HC RX 250 WO HCPCS: Performed by: RADIOLOGY

## 2023-09-05 PROCEDURE — 99152 MOD SED SAME PHYS/QHP 5/>YRS: CPT

## 2023-09-05 PROCEDURE — 71045 X-RAY EXAM CHEST 1 VIEW: CPT

## 2023-09-05 PROCEDURE — 88305 TISSUE EXAM BY PATHOLOGIST: CPT

## 2023-09-05 RX ORDER — MIDAZOLAM HYDROCHLORIDE 2 MG/2ML
INJECTION, SOLUTION INTRAMUSCULAR; INTRAVENOUS PRN
Status: COMPLETED | OUTPATIENT
Start: 2023-09-05 | End: 2023-09-05

## 2023-09-05 RX ORDER — FENTANYL CITRATE 50 UG/ML
INJECTION, SOLUTION INTRAMUSCULAR; INTRAVENOUS PRN
Status: COMPLETED | OUTPATIENT
Start: 2023-09-05 | End: 2023-09-05

## 2023-09-05 RX ORDER — LIDOCAINE HYDROCHLORIDE 20 MG/ML
INJECTION, SOLUTION INFILTRATION; PERINEURAL PRN
Status: COMPLETED | OUTPATIENT
Start: 2023-09-05 | End: 2023-09-05

## 2023-09-05 RX ADMIN — FENTANYL CITRATE 50 MCG: 50 INJECTION, SOLUTION INTRAMUSCULAR; INTRAVENOUS at 09:25

## 2023-09-05 RX ADMIN — FENTANYL CITRATE 25 MCG: 50 INJECTION, SOLUTION INTRAMUSCULAR; INTRAVENOUS at 09:44

## 2023-09-05 RX ADMIN — MIDAZOLAM HYDROCHLORIDE 0.5 MG: 1 INJECTION, SOLUTION INTRAMUSCULAR; INTRAVENOUS at 09:43

## 2023-09-05 RX ADMIN — FENTANYL CITRATE 25 MCG: 50 INJECTION, SOLUTION INTRAMUSCULAR; INTRAVENOUS at 09:37

## 2023-09-05 RX ADMIN — MIDAZOLAM HYDROCHLORIDE 0.5 MG: 1 INJECTION, SOLUTION INTRAMUSCULAR; INTRAVENOUS at 09:32

## 2023-09-05 RX ADMIN — MIDAZOLAM HYDROCHLORIDE 1 MG: 1 INJECTION, SOLUTION INTRAMUSCULAR; INTRAVENOUS at 09:25

## 2023-09-05 RX ADMIN — LIDOCAINE HYDROCHLORIDE 10 ML: 20 INJECTION, SOLUTION INFILTRATION; PERINEURAL at 09:41

## 2023-09-05 NOTE — BRIEF OP NOTE
Department of Interventional Radiology  [phone]        Interventional Radiology Brief Procedure Note    Patient: Mike Fuentes MRN: [id number]  SSN: [id number]    YOB: 1980  Age: 39 y.o.   Sex: female      Date of Procedure: 9/5/2023    Pre-Procedure Diagnosis: BREAST CA    Post-Procedure Diagnosis: SAME    Procedure(s): Image Guided Biopsy    Brief Description of Procedure: right lung nodule biopsy    Performed By: Willie Ramírez MD     Assistants: None    Anesthesia:Moderate Sedation    Estimated Blood Loss: None    Specimens:  Pathology    Implants:  None    Findings: 15mm firm nodule    Complications: None    Recommendations: CXR 2 hrs     Follow Up: with oncologist    Signed By: Willie Ramírez MD     September 5, 2023

## 2023-09-05 NOTE — H&P
capsule  23   Historical Provider, MD   HYDROcodone-acetaminophen (NORCO)  MG per tablet Take 1 tablet by mouth every 6 hours as needed. Historical Provider, MD   hydroxychloroquine (PLAQUENIL) 200 MG tablet TAKE 1 TABLET BY MOUTH 3 TIMES A WEEK; MONDAY, TUESDAY, AND WEDNESDAY. Historical Provider, MD   latanoprost (XALATAN) 0.005 % ophthalmic solution  23   Historical Provider, MD   omeprazole (PRILOSEC) 20 MG delayed release capsule Take 1 capsule every day by oral route. Historical Provider, MD   predniSONE 5 MG/5ML solution prednisone Take No date recorded No form recorded No frequency recorded No route recorded No set duration recorded No set duration amount recorded active No dosage strength recorded No dosage strength units of measure recorded    Historical Provider, MD        Allergies   Allergen Reactions    Sulfa Antibiotics Rash    Acetazolamide        No family history on file. Social History     Tobacco Use    Smoking status: Former     Types: Cigarettes     Quit date:      Years since quittin.6    Smokeless tobacco: Never   Substance Use Topics    Alcohol use: Not Currently        Not in a hospital admission. Objective:       Physical Examination:    There were no vitals filed for this visit. Pain Assessment                   5 in left leg                                  HEART: regular rate and rhythm, S1, S2 normal, no murmur, click, rub or gallop  LUNG: clear to auscultation bilaterally  ABDOMEN: soft, non-tender; bowel sounds normal; no masses,  no organomegaly  EXTREMITIES: 2+ edema.   Bandage on left lower leg due to chronic wound    Laboratory:     Lab Results   Component Value Date/Time     2023 06:01 AM     2023 04:06 AM    K 3.7 2023 06:01 AM    K 2.8 2023 04:06 AM     2023 06:01 AM     2023 04:06 AM    CO2 29 2023 06:01 AM    CO2 27 2023 04:06 AM    BUN 33 2023 06:01 AM

## 2023-09-05 NOTE — OR NURSING
Recovery period without difficulty. Pt alert and oriented and denies pain. Dressing is clean, dry, and intact. Reviewed discharge instructions with patient and parents, both verbalized understanding. [facility] Vital signs and Igor score completed.

## 2023-09-05 NOTE — PROGRESS NOTES
TRANSFER - OUT REPORT:    Verbal report given to [address] [place] [address] on Ruben Kuo being transferred to [facility]  for routine progression of patient care       Report consisted of patient's Situation, Background, Assessment and   Recommendations(SBAR). Information from the following report(s) Nurse Handoff Report was reviewed with the receiving nurse. Opportunity for questions and clarification was provided.     Lab samples collected, labeled and sent to lab    Patient transported with:   Registered Nurse

## 2023-09-05 NOTE — PRE SEDATION
Sedation Pre-Procedure Note    Patient Name: William Nuno   YOB: 1980  Room/Bed: Room/bed info not found  Medical Record Number: [id number]  Date: 9/5/2023   Time: 8:26 AM       Indication:  Lung nodule    Consent: I have discussed with the patient and/or the patient representative the indication, alternatives, and the possible risks and/or complications of the planned procedure and the anesthesia methods. The patient and/or patient representative appear to understand and agree to proceed. Vital Signs: There were no vitals filed for this visit. Past Medical History:   has a past medical history of Angiodysplasia of gastrointestinal tract, Chronic deep vein thrombosis (DVT) of lower extremity (HCC), Closed displaced fracture of fifth metatarsal bone of right foot with delayed healing, Fibromyalgia, Glaucoma, Kidney disease, Lupus ([address]), Rheumatoid arthritis ([address]), and Septic shock ([address]). Past Surgical History:   has a past surgical history that includes Hysterectomy; Cholecystectomy; Kidney biopsy; and Eye surgery. Medications:   Scheduled Meds:   Continuous Infusions:   PRN Meds:   Home Meds:   Prior to Admission medications    Medication Sig Start Date End Date Taking? Authorizing Provider   enoxaparin (LOVENOX) 150 MG/ML SOSY injection Inject 0.87 mLs into the skin 2 times daily 8/21/23 9/20/23  aSlud Villa MD   febuxostat (ULORIC) 40 MG TABS tablet Take 2 tablets by mouth daily    Historical Provider, MD   acetaminophen (TYLENOL) 325 MG tablet TAKE 1 TABLETS (650 MG) BY ORAL ROUTE EVERY 6 HOURS AS NEEDED FOR FEVER/PAIN. 6/24/23   Historical Provider, MD   ascorbic acid (VITAMIN C) 500 MG tablet Take 1 tablet every day by oral route. 6/25/23   Historical Provider, MD   bacitracin 500 UNIT/GM ointment APPLY TOPICALLY TO AFFECTED AREA BID 6/25/23   Historical Provider, MD   brimonidine (ALPHAGAN) 0.2 % ophthalmic solution ADMINISTER 1 DROP TO THE RIGHT EYE BID.     Historical Provider, MD

## 2023-09-05 NOTE — PROGRESS NOTES
Date: 9/5/2023        Patient Name: Zahra Livingston     YOB: 1980          Chief Complaint   No chief complaint on file. History of Present Illness   Zahra Livingston is a 39 y. o. who presents today for     Past Medical History     Past Medical History:   Diagnosis Date    Angiodysplasia of gastrointestinal tract 10/25/2019    Last Assessment & Plan:  Formatting of this note might be different from the original. Repeat CBC    Chronic deep vein thrombosis (DVT) of lower extremity ([address]) 8/29/2020    Last Assessment & Plan:  Formatting of this note might be different from the original. INR higher today despite holding warfarin. Cont to hold, hgb stable, no indication for Vit K at this time. Repeat inr in am.    Closed displaced fracture of fifth metatarsal bone of right foot with delayed healing 5/27/2023    Fibromyalgia     Glaucoma     Kidney disease     Lupus ([address])     Rheumatoid arthritis ([address])     Septic shock ([address]) 8/26/2020    Last Assessment & Plan:  Formatting of this note might be different from the original. HR ad BP have improved, Levophed discontinued. Reduce IV fluids and continue empiric antibiotics with daptomycin and Zosyn trying to minimize nephrotoxic agents. Follow blood cultures which are negative so far. Likely source is cellulitis along with immunocompromised state.   cxr and u/a neg for pneumonia and        Past Surgical History     Past Surgical History:   Procedure Laterality Date    CHOLECYSTECTOMY      CT NEEDLE BIOPSY LUNG PERCUTANEOUS  9/5/2023    CT NEEDLE BIOPSY LUNG PERCUTANEOUS 9/5/2023 [facility] CT SCAN    EYE SURGERY      x4    HYSTERECTOMY (CERVIX STATUS UNKNOWN)      KIDNEY BIOPSY          Medications      Current Outpatient Medications:     enoxaparin (LOVENOX) 150 MG/ML SOSY injection, Inject 0.87 mLs into the skin 2 times daily, Disp: 60 mL, Rfl: 0    febuxostat (ULORIC) 40 MG TABS tablet, Take 2 tablets by mouth daily, Disp: , Rfl:

## 2023-09-06 ENCOUNTER — OFFICE VISIT (OUTPATIENT)
Dept: ONCOLOGY | Age: 43
End: 2023-09-06
Payer: MEDICARE

## 2023-09-06 ENCOUNTER — HOSPITAL ENCOUNTER (OUTPATIENT)
Dept: LAB | Age: 43
Discharge: HOME | End: 2023-09-09
Payer: MEDICARE

## 2023-09-06 VITALS
RESPIRATION RATE: 18 BRPM | WEIGHT: 293 LBS | DIASTOLIC BLOOD PRESSURE: 81 MMHG | WEIGHT: 293 LBS | TEMPERATURE: 98 F | RESPIRATION RATE: 18 BRPM | SYSTOLIC BLOOD PRESSURE: 120 MMHG | HEIGHT: 67 IN | TEMPERATURE: 98 F | SYSTOLIC BLOOD PRESSURE: 120 MMHG | BODY MASS INDEX: 45.99 KG/M2 | BODY MASS INDEX: 46.05 KG/M2 | OXYGEN SATURATION: 100 % | DIASTOLIC BLOOD PRESSURE: 81 MMHG | HEART RATE: 90 BPM | OXYGEN SATURATION: 100 % | HEART RATE: 93 BPM

## 2023-09-06 DIAGNOSIS — R92.8 OTHER ABNORMAL AND INCONCLUSIVE FINDINGS ON DIAGNOSTIC IMAGING OF BREAST: ICD-10-CM

## 2023-09-06 DIAGNOSIS — Z12.39 ENCOUNTER FOR SCREENING FOR MALIGNANT NEOPLASM OF BREAST, UNSPECIFIED SCREENING MODALITY: ICD-10-CM

## 2023-09-06 DIAGNOSIS — R93.89 ABNORMAL FINDING ON IMAGING: Primary | ICD-10-CM

## 2023-09-06 DIAGNOSIS — I26.99 PULMONARY EMBOLISM ON RIGHT (HCC): Primary | ICD-10-CM

## 2023-09-06 DIAGNOSIS — D50.0 IRON DEFICIENCY ANEMIA SECONDARY TO BLOOD LOSS (CHRONIC): ICD-10-CM

## 2023-09-06 DIAGNOSIS — I26.99 PULMONARY EMBOLISM ON RIGHT (HCC): ICD-10-CM

## 2023-09-06 DIAGNOSIS — R91.1 NODULE OF LOWER LOBE OF RIGHT LUNG: ICD-10-CM

## 2023-09-06 DIAGNOSIS — R97.0 ELEVATED CEA: ICD-10-CM

## 2023-09-06 LAB
ALBUMIN SERPL-MCNC: 3 G/DL (ref 3.5–5)
ALBUMIN/GLOB SERPL: 0.8 (ref 0.4–1.6)
ALP SERPL-CCNC: 77 U/L (ref 50–136)
ALT SERPL-CCNC: 18 U/L (ref 12–65)
ANION GAP SERPL CALC-SCNC: 8 MMOL/L (ref 2–11)
AST SERPL-CCNC: 22 U/L (ref 15–37)
BASOPHILS # BLD: 0.1 K/UL (ref 0–0.2)
BASOPHILS NFR BLD: 0 % (ref 0–2)
BILIRUB SERPL-MCNC: 0.4 MG/DL (ref 0.2–1.1)
BUN SERPL-MCNC: 20 MG/DL (ref 6–23)
CALCIUM SERPL-MCNC: 8.7 MG/DL (ref 8.3–10.4)
CHLORIDE SERPL-SCNC: 104 MMOL/L (ref 101–110)
CO2 SERPL-SCNC: 27 MMOL/L (ref 21–32)
CREAT SERPL-MCNC: 1.6 MG/DL (ref 0.6–1)
DIFFERENTIAL METHOD BLD: ABNORMAL
EOSINOPHIL # BLD: 0 K/UL (ref 0–0.8)
EOSINOPHIL NFR BLD: 0 % (ref 0.5–7.8)
ERYTHROCYTE [DISTWIDTH] IN BLOOD BY AUTOMATED COUNT: 16.8 % (ref 11.9–14.6)
GLOBULIN SER CALC-MCNC: 3.8 G/DL (ref 2.8–4.5)
GLUCOSE SERPL-MCNC: 120 MG/DL (ref 65–100)
HCT VFR BLD AUTO: 32.5 % (ref 35.8–46.3)
HGB BLD-MCNC: 9.6 G/DL (ref 11.7–15.4)
IMM GRANULOCYTES # BLD AUTO: 0.3 K/UL (ref 0–0.5)
IMM GRANULOCYTES NFR BLD AUTO: 3 % (ref 0–5)
LYMPHOCYTES # BLD: 1.3 K/UL (ref 0.5–4.6)
LYMPHOCYTES NFR BLD: 11 % (ref 13–44)
MCH RBC QN AUTO: 29.4 PG (ref 26.1–32.9)
MCHC RBC AUTO-ENTMCNC: 29.5 G/DL (ref 31.4–35)
MCV RBC AUTO: 99.7 FL (ref 82–102)
MONOCYTES # BLD: 0.8 K/UL (ref 0.1–1.3)
MONOCYTES NFR BLD: 6 % (ref 4–12)
NEUTS SEG # BLD: 9.4 K/UL (ref 1.7–8.2)
NEUTS SEG NFR BLD: 79 % (ref 43–78)
NRBC # BLD: 0 K/UL (ref 0–0.2)
PLATELET # BLD AUTO: 308 K/UL (ref 150–450)
PMV BLD AUTO: 8.6 FL (ref 9.4–12.3)
POTASSIUM SERPL-SCNC: 3.5 MMOL/L (ref 3.5–5.1)
PROT SERPL-MCNC: 6.8 G/DL (ref 6.3–8.2)
RBC # BLD AUTO: 3.26 M/UL (ref 4.05–5.2)
SODIUM SERPL-SCNC: 139 MMOL/L (ref 133–143)
WBC # BLD AUTO: 11.9 K/UL (ref 4.3–11.1)

## 2023-09-06 PROCEDURE — 80053 COMPREHEN METABOLIC PANEL: CPT

## 2023-09-06 PROCEDURE — 84166 PROTEIN E-PHORESIS/URINE/CSF: CPT

## 2023-09-06 PROCEDURE — 3079F DIAST BP 80-89 MM HG: CPT | Performed by: INTERNAL MEDICINE

## 2023-09-06 PROCEDURE — 86334 IMMUNOFIX E-PHORESIS SERUM: CPT

## 2023-09-06 PROCEDURE — 1111F DSCHRG MED/CURRENT MED MERGE: CPT | Performed by: INTERNAL MEDICINE

## 2023-09-06 PROCEDURE — 1036F TOBACCO NON-USER: CPT | Performed by: INTERNAL MEDICINE

## 2023-09-06 PROCEDURE — 36415 COLL VENOUS BLD VENIPUNCTURE: CPT

## 2023-09-06 PROCEDURE — 3079F DIAST BP 80-89 MM HG: CPT | Performed by: OBSTETRICS & GYNECOLOGY

## 2023-09-06 PROCEDURE — 3074F SYST BP LT 130 MM HG: CPT | Performed by: OBSTETRICS & GYNECOLOGY

## 2023-09-06 PROCEDURE — 3074F SYST BP LT 130 MM HG: CPT | Performed by: INTERNAL MEDICINE

## 2023-09-06 PROCEDURE — 82784 ASSAY IGA/IGD/IGG/IGM EACH: CPT

## 2023-09-06 PROCEDURE — 1111F DSCHRG MED/CURRENT MED MERGE: CPT | Performed by: OBSTETRICS & GYNECOLOGY

## 2023-09-06 PROCEDURE — G8417 CALC BMI ABV UP PARAM F/U: HCPCS | Performed by: OBSTETRICS & GYNECOLOGY

## 2023-09-06 PROCEDURE — 99215 OFFICE O/P EST HI 40 MIN: CPT | Performed by: INTERNAL MEDICINE

## 2023-09-06 PROCEDURE — 1036F TOBACCO NON-USER: CPT | Performed by: OBSTETRICS & GYNECOLOGY

## 2023-09-06 PROCEDURE — 84165 PROTEIN E-PHORESIS SERUM: CPT

## 2023-09-06 PROCEDURE — 99202 OFFICE O/P NEW SF 15 MIN: CPT | Performed by: OBSTETRICS & GYNECOLOGY

## 2023-09-06 PROCEDURE — 85025 COMPLETE CBC W/AUTO DIFF WBC: CPT

## 2023-09-06 PROCEDURE — 83521 IG LIGHT CHAINS FREE EACH: CPT

## 2023-09-06 PROCEDURE — 84156 ASSAY OF PROTEIN URINE: CPT

## 2023-09-06 PROCEDURE — G8427 DOCREV CUR MEDS BY ELIG CLIN: HCPCS | Performed by: INTERNAL MEDICINE

## 2023-09-06 PROCEDURE — G8417 CALC BMI ABV UP PARAM F/U: HCPCS | Performed by: INTERNAL MEDICINE

## 2023-09-06 PROCEDURE — G8428 CUR MEDS NOT DOCUMENT: HCPCS | Performed by: OBSTETRICS & GYNECOLOGY

## 2023-09-06 ASSESSMENT — PATIENT HEALTH QUESTIONNAIRE - PHQ9
SUM OF ALL RESPONSES TO PHQ QUESTIONS 1-9: 0
1. LITTLE INTEREST OR PLEASURE IN DOING THINGS: 0
SUM OF ALL RESPONSES TO PHQ QUESTIONS 1-9: 0
SUM OF ALL RESPONSES TO PHQ9 QUESTIONS 1 & 2: 0
2. FEELING DOWN, DEPRESSED OR HOPELESS: 0
SUM OF ALL RESPONSES TO PHQ QUESTIONS 1-9: 0
2. FEELING DOWN, DEPRESSED OR HOPELESS: 0
SUM OF ALL RESPONSES TO PHQ QUESTIONS 1-9: 0
SUM OF ALL RESPONSES TO PHQ QUESTIONS 1-9: 0
1. LITTLE INTEREST OR PLEASURE IN DOING THINGS: 0
SUM OF ALL RESPONSES TO PHQ9 QUESTIONS 1 & 2: 0
SUM OF ALL RESPONSES TO PHQ QUESTIONS 1-9: 0
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
## 2023-09-06 NOTE — PATIENT INSTRUCTIONS
Patient Instructions from Today's Visit    Reason for Visit:  New patient visit      Plan:    No monitoring needed. Follow Up:  - as needed    Recent Lab Results:      Treatment Summary has been discussed and given to patient:         -------------------------------------------------------------------------------------------------------------------    Patient did express an interest in My Chart. My Chart log in information explained on the after visit summary printout at the [address] desk.     Jyotsna Tan RN
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
## 2023-09-07 LAB
KAPPA LC FREE SER-MCNC: 84.8 MG/L (ref 3.3–19.4)
KAPPA LC FREE/LAMBDA FREE SER: 1.66 (ref 0.26–1.65)
LAMBDA LC FREE SERPL-MCNC: 51.1 MG/L (ref 5.7–26.3)

## 2023-09-08 LAB
ALBUMIN MFR UR ELPH: 68.1 %
ALPHA1 GLOB MFR UR ELPH: 4.9 %
ALPHA2 GLOB 24H MFR UR ELPH: 8.7 %
B-GLOBULIN 24H MFR UR ELPH: 11.8 %
GAMMA GLOB 24H MFR UR ELPH: 6.4 %
LABORATORY COMMENT REPORT: NORMAL
M PROTEIN MFR UR ELPH: NORMAL %
PROT UR-MCNC: 49.2 MG/DL

## 2023-09-12 ENCOUNTER — HOSPITAL ENCOUNTER (OUTPATIENT)
Dept: PET IMAGING | Age: 43
Discharge: HOME | End: 2023-09-15
Payer: MEDICARE

## 2023-09-12 DIAGNOSIS — I26.99 PULMONARY EMBOLISM ON RIGHT (HCC): ICD-10-CM

## 2023-09-12 DIAGNOSIS — R91.1 NODULE OF LOWER LOBE OF RIGHT LUNG: ICD-10-CM

## 2023-09-12 DIAGNOSIS — D50.0 IRON DEFICIENCY ANEMIA SECONDARY TO BLOOD LOSS (CHRONIC): ICD-10-CM

## 2023-09-12 LAB
ALBUMIN SERPL ELPH-MCNC: 3.1 G/DL (ref 2.9–4.4)
ALBUMIN/GLOB SERPL: 1.2 (ref 0.7–1.7)
ALPHA1 GLOB SERPL ELPH-MCNC: 0.3 G/DL (ref 0–0.4)
ALPHA2 GLOB SERPL ELPH-MCNC: 1.1 G/DL (ref 0.4–1)
B-GLOBULIN SERPL ELPH-MCNC: 0.8 G/DL (ref 0.7–1.3)
GAMMA GLOB SERPL ELPH-MCNC: 0.7 G/DL (ref 0.4–1.8)
GLOBULIN SER-MCNC: 2.8 G/DL (ref 2.2–3.9)
GLUCOSE BLD STRIP.AUTO-MCNC: 112 MG/DL (ref 65–100)
IGA SERPL-MCNC: 66 MG/DL (ref 87–352)
IGG SERPL-MCNC: 818 MG/DL (ref 586–1602)
IGM SERPL-MCNC: 29 MG/DL (ref 26–217)
INTERPRETATION SERPL IEP-IMP: ABNORMAL
M PROTEIN SERPL ELPH-MCNC: ABNORMAL G/DL
PROT SERPL-MCNC: 5.9 G/DL (ref 6–8.5)
SERVICE CMNT-IMP: ABNORMAL

## 2023-09-12 PROCEDURE — 78815 PET IMAGE W/CT SKULL-THIGH: CPT

## 2023-09-12 PROCEDURE — 2580000003 HC RX 258: Performed by: INTERNAL MEDICINE

## 2023-09-12 PROCEDURE — 3430000000 HC RX DIAGNOSTIC RADIOPHARMACEUTICAL: Performed by: INTERNAL MEDICINE

## 2023-09-12 PROCEDURE — A9552 F18 FDG: HCPCS | Performed by: INTERNAL MEDICINE

## 2023-09-12 PROCEDURE — 82962 GLUCOSE BLOOD TEST: CPT

## 2023-09-12 PROCEDURE — 6360000004 HC RX CONTRAST MEDICATION: Performed by: INTERNAL MEDICINE

## 2023-09-12 RX ORDER — FLUDEOXYGLUCOSE F 18 200 MCI/ML
14.28 INJECTION, SOLUTION INTRAVENOUS
Status: COMPLETED | OUTPATIENT
Start: 2023-09-12 | End: 2023-09-12

## 2023-09-12 RX ORDER — SODIUM CHLORIDE 0.9 % (FLUSH) 0.9 %
10 SYRINGE (ML) INJECTION AS NEEDED
Status: DISCONTINUED | OUTPATIENT
Start: 2023-09-12 | End: 2023-09-16 | Stop reason: HOSPADM

## 2023-09-12 RX ADMIN — FLUDEOXYGLUCOSE F 18 14.28 MILLICURIE: 200 INJECTION, SOLUTION INTRAVENOUS at 07:16

## 2023-09-12 RX ADMIN — SODIUM CHLORIDE, PRESERVATIVE FREE 10 ML: 5 INJECTION INTRAVENOUS at 07:16

## 2023-09-12 RX ADMIN — DIATRIZOATE MEGLUMINE AND DIATRIZOATE SODIUM 10 ML: 660; 100 LIQUID ORAL; RECTAL at 07:16

## 2023-09-19 ENCOUNTER — TELEPHONE (OUTPATIENT)
Dept: ONCOLOGY | Age: 43
End: 2023-09-19

## 2023-09-20 ENCOUNTER — TELEPHONE (OUTPATIENT)
Dept: RADIATION ONCOLOGY | Age: 43
End: 2023-09-20

## 2023-09-20 RX ORDER — ENOXAPARIN SODIUM 150 MG/ML
1 INJECTION SUBCUTANEOUS 2 TIMES DAILY
Qty: 60 ML | Refills: 0 | Status: SHIPPED | OUTPATIENT
Start: 2023-09-20 | End: 2023-10-20

## 2023-09-20 NOTE — TELEPHONE ENCOUNTER
Medication Requested: Lovenox    Date last prescribed: 8/21/23    Requested Pharmacy: [facility] in [facility]. Action Taken: Reviewed with Dr. Amanda Diaz team.  OK to renew until complete workup is done. Patient notified of refill sent.

## 2023-09-21 ENCOUNTER — HOSPITAL ENCOUNTER (OUTPATIENT)
Dept: MRI IMAGING | Age: 43
Discharge: HOME | End: 2023-09-21
Attending: NEUROLOGICAL SURGERY
Payer: MEDICARE

## 2023-09-21 DIAGNOSIS — C41.0 MALIGNANT NEOPLASM OF BONES OF SKULL AND FACE (HCC): ICD-10-CM

## 2023-09-21 PROCEDURE — 70553 MRI BRAIN STEM W/O & W/DYE: CPT

## 2023-09-21 PROCEDURE — 6360000004 HC RX CONTRAST MEDICATION: Performed by: NEUROLOGICAL SURGERY

## 2023-09-21 PROCEDURE — A9579 GAD-BASE MR CONTRAST NOS,1ML: HCPCS | Performed by: NEUROLOGICAL SURGERY

## 2023-09-21 RX ADMIN — GADOTERIDOL 26 ML: 279.3 INJECTION, SOLUTION INTRAVENOUS at 14:57

## 2023-10-02 DIAGNOSIS — R93.89 ABNORMAL FINDING ON IMAGING: Primary | ICD-10-CM

## 2023-10-02 DIAGNOSIS — D50.0 IRON DEFICIENCY ANEMIA SECONDARY TO BLOOD LOSS (CHRONIC): ICD-10-CM

## 2023-10-02 DIAGNOSIS — I26.99 PULMONARY EMBOLISM ON RIGHT (HCC): ICD-10-CM
